# Patient Record
Sex: MALE | Race: WHITE | NOT HISPANIC OR LATINO | Employment: FULL TIME | URBAN - METROPOLITAN AREA
[De-identification: names, ages, dates, MRNs, and addresses within clinical notes are randomized per-mention and may not be internally consistent; named-entity substitution may affect disease eponyms.]

---

## 2019-04-01 ENCOUNTER — TRANSFERRED RECORDS (OUTPATIENT)
Dept: HEALTH INFORMATION MANAGEMENT | Facility: CLINIC | Age: 54
End: 2019-04-01

## 2019-08-08 ENCOUNTER — TRANSFERRED RECORDS (OUTPATIENT)
Dept: HEALTH INFORMATION MANAGEMENT | Facility: CLINIC | Age: 54
End: 2019-08-08

## 2019-08-26 ENCOUNTER — TRANSFERRED RECORDS (OUTPATIENT)
Dept: HEALTH INFORMATION MANAGEMENT | Facility: CLINIC | Age: 54
End: 2019-08-26

## 2021-03-31 ENCOUNTER — ANCILLARY PROCEDURE (OUTPATIENT)
Dept: GENERAL RADIOLOGY | Facility: CLINIC | Age: 56
End: 2021-03-31
Attending: ORTHOPAEDIC SURGERY
Payer: COMMERCIAL

## 2021-03-31 ENCOUNTER — OFFICE VISIT (OUTPATIENT)
Dept: ORTHOPEDICS | Facility: CLINIC | Age: 56
End: 2021-03-31
Payer: COMMERCIAL

## 2021-03-31 VITALS
HEIGHT: 69 IN | WEIGHT: 240.6 LBS | SYSTOLIC BLOOD PRESSURE: 120 MMHG | DIASTOLIC BLOOD PRESSURE: 90 MMHG | BODY MASS INDEX: 35.63 KG/M2

## 2021-03-31 DIAGNOSIS — M16.12 PRIMARY OSTEOARTHRITIS OF LEFT HIP: ICD-10-CM

## 2021-03-31 DIAGNOSIS — M25.551 BILATERAL HIP PAIN: ICD-10-CM

## 2021-03-31 DIAGNOSIS — M25.552 BILATERAL HIP PAIN: ICD-10-CM

## 2021-03-31 DIAGNOSIS — M16.11 PRIMARY OSTEOARTHRITIS OF RIGHT HIP: Primary | ICD-10-CM

## 2021-03-31 PROCEDURE — 73523 X-RAY EXAM HIPS BI 5/> VIEWS: CPT | Mod: TC | Performed by: RADIOLOGY

## 2021-03-31 PROCEDURE — 99204 OFFICE O/P NEW MOD 45 MIN: CPT | Performed by: ORTHOPAEDIC SURGERY

## 2021-03-31 RX ORDER — DICLOFENAC SODIUM 75 MG/1
75 TABLET, DELAYED RELEASE ORAL 2 TIMES DAILY PRN
Qty: 45 TABLET | Refills: 1 | Status: SHIPPED | OUTPATIENT
Start: 2021-03-31 | End: 2021-05-11

## 2021-03-31 SDOH — HEALTH STABILITY: MENTAL HEALTH: HOW MANY DRINKS CONTAINING ALCOHOL DO YOU HAVE ON A TYPICAL DAY WHEN YOU ARE DRINKING?: NOT ASKED

## 2021-03-31 SDOH — HEALTH STABILITY: MENTAL HEALTH: HOW OFTEN DO YOU HAVE SIX OR MORE DRINKS ON ONE OCCASION?: NOT ASKED

## 2021-03-31 SDOH — HEALTH STABILITY: MENTAL HEALTH: HOW OFTEN DO YOU HAVE A DRINK CONTAINING ALCOHOL?: NOT ASKED

## 2021-03-31 ASSESSMENT — PAIN SCALES - GENERAL: PAINLEVEL: WORST PAIN (10)

## 2021-03-31 ASSESSMENT — MIFFLIN-ST. JEOR: SCORE: 1908.79

## 2021-03-31 NOTE — LETTER
"    3/31/2021         RE: Marito Hernandes  [address]  [facility] [address]        Dear Colleague,    Thank you for referring your patient, Marito Hernandes, to the [facility]. Please see a copy of my visit note below.    ORTHOPEDIC CONSULT      Chief Complaint: Marito Hernandes is a 55 year old male who is being seen for   Chief Complaint   Patient presents with     Musculoskeletal Problem     bilateral hip pain     Consult       History of Present Illness:   Marito Hernandes is a 55 year old male who is seen in consultation at the request of self for evaluation of bilateral hip pain.  Patient reports many years of  R > L hip pain. No injury or incident started the pain. Reports slowly progressive pain. Described as c shaped pain around the hip.  Non-radiating. No numbness/tingling.  Worst with weight bearing, activity, walking and especially bad at night. Not much helps the pain anymore.   Treatments tried: multiple previous intra-articular steroid injections - last on 3/5/21 and lasted only 2 weeks on the right, still working on the left, activity modification, rest, watchful waiting, occasional ibuprofen, tylenol, aleve.  No previous surgeries to the hips. Was seeing [facility], recommended for total hip.   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     Hx of DM. Last A1c 7.3 2/2021. Takes metformin. PCP is with an outside group. Denies blood thinner or smoking hx.     Patient's past medical, surgical, social and family histories reviewed.     No past medical history on file.  DM  HX      No past surgical history on file.    Medications:  No current outpatient medications on file prior to visit.  No current facility-administered medications on file prior to visit.   Metformin  \"HTN medication\"      No Known Allergies    Social History     Occupational History     Not on file   Tobacco Use     Smoking " "status: Never Smoker     Smokeless tobacco: Never Used   Substance and Sexual Activity     Alcohol use: Yes     Drug use: Never     Sexual activity: Not on file       No family history on file.    REVIEW OF SYSTEMS  10 point review systems performed otherwise negative as noted as per history of present illness.    Physical Exam:  Vitals: BP (!) 120/90   Ht 1.74 m (5' 8.5\")   Wt 109.1 kg (240 lb 9.6 oz)   BMI 36.05 kg/m    BMI= Body mass index is 36.05 kg/m .  Constitutional: healthy, alert and no acute distress   Psychiatric: mentation appears normal and affect normal/bright  NEURO: no focal deficits  RESP: Normal with easy respirations and no use of accessory muscles to breathe, no audible wheezing or retractions  CV: RLE: no edema         Regular rate and rhythm by palpation  JOINT/EXTREMITIES:bilateral lower extremity: hip flexion 15 degrees less on the right compared to right. Mild pain with hip flexion on right. Good quad tone. Moderate reduction in internal and external rotation on the right. Pain with IR and ER, recreates hip pain. Negative straight leg raise. No focal tenderness to greater trochanteric and SI joint. Distal neurovascular grossly intact.    Lymph: no appreciated lymphedema   GAIT: not tested     Diagnostic Modalities:  bilateral hip X-ray: superior wear , with moderate joint space narrowing, subchondral sclerosis R > L  Independent visualization of the images was performed.      Impression: bilateral hip primary osteoarthritis    Plan:  All of the above pertinent physical exam and imaging modalities findings was reviewed with Marito and his spouse.  He has exhausted extensive conservative and having greater impact on quality of life. He would like to proceed with total hip replacement. However, he did have a steroid injection on 3/5/21, explained would need to wait unttil 6/5/21.  In the meantime, provided Voltaren Rx, handicap sticker (due to severe pain with ambulation), and will have " patient return to clinic about 4-6 weeks prior to when he would like joint replacement.     I have prescribed an antiinflammatory medication.  We discussed that it is the same class of some the common over the counter medications (Ibuprofen, Advil, Motrin, Aleve, Naproxen, and  Naprosyn). I recommend to avoid taking these OTC's medication when taking the medication that I prescribed. This medication should be stopped if having stomach issues, bleeding, high blood pressure and/or chest pain.       Return to clinic 4-6 weeks prior to when he wants total hip replacement, or sooner as needed for changes.  Re-x-ray on return: No    Scribed by:  TATUM Cabral, CNP  12:44 PM  3/31/2021  The information in this document, created by a scribe for me, accurately reflects the services I personally performed and the decisions made by me. I have reviewed and approved this document for accuracy    DO Marito Mayen to follow up with Primary Care provider regarding elevated blood pressure.  6 MONTH HANDICAP PARKING FORM GIVEN TO patient    Abelardo/FARRAH       Again, thank you for allowing me to participate in the care of your patient.        Sincerely,        Usman Fletcher DO

## 2021-03-31 NOTE — PROGRESS NOTES
Marito to follow up with Primary Care provider regarding elevated blood pressure.  6 MONTH HANDICAP PARKING FORM GIVEN TO patient    Abelardo/FARRAH

## 2021-03-31 NOTE — PROGRESS NOTES
"ORTHOPEDIC CONSULT      Chief Complaint: Marito Hernandes is a 55 year old male who is being seen for   Chief Complaint   Patient presents with     Musculoskeletal Problem     bilateral hip pain     Consult       History of Present Illness:   Marito Hernandes is a 55 year old male who is seen in consultation at the request of self for evaluation of bilateral hip pain.  Patient reports many years of  R > L hip pain. No injury or incident started the pain. Reports slowly progressive pain. Described as c shaped pain around the hip.  Non-radiating. No numbness/tingling.  Worst with weight bearing, activity, walking and especially bad at night. Not much helps the pain anymore.   Treatments tried: multiple previous intra-articular steroid injections - last on 3/5/21 and lasted only 2 weeks on the right, still working on the left, activity modification, rest, watchful waiting, occasional ibuprofen, tylenol, aleve.  No previous surgeries to the hips. Was seeing [facility], recommended for total hip.   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     Hx of DM. Last A1c 7.3 2/2021. Takes metformin. PCP is with an outside group. Denies blood thinner or smoking hx.     Patient's past medical, surgical, social and family histories reviewed.     No past medical history on file.  DM  HX      No past surgical history on file.    Medications:  No current outpatient medications on file prior to visit.  No current facility-administered medications on file prior to visit.   Metformin  \"HTN medication\"      No Known Allergies    Social History     Occupational History     Not on file   Tobacco Use     Smoking status: Never Smoker     Smokeless tobacco: Never Used   Substance and Sexual Activity     Alcohol use: Yes     Drug use: Never     Sexual activity: Not on file       No family history on file.    REVIEW OF SYSTEMS  10 point review systems performed " "otherwise negative as noted as per history of present illness.    Physical Exam:  Vitals: BP (!) 120/90   Ht 1.74 m (5' 8.5\")   Wt 109.1 kg (240 lb 9.6 oz)   BMI 36.05 kg/m    BMI= Body mass index is 36.05 kg/m .  Constitutional: healthy, alert and no acute distress   Psychiatric: mentation appears normal and affect normal/bright  NEURO: no focal deficits  RESP: Normal with easy respirations and no use of accessory muscles to breathe, no audible wheezing or retractions  CV: RLE: no edema         Regular rate and rhythm by palpation  JOINT/EXTREMITIES:bilateral lower extremity: hip flexion 15 degrees less on the right compared to right. Mild pain with hip flexion on right. Good quad tone. Moderate reduction in internal and external rotation on the right. Pain with IR and ER, recreates hip pain. Negative straight leg raise. No focal tenderness to greater trochanteric and SI joint. Distal neurovascular grossly intact.    Lymph: no appreciated lymphedema   GAIT: not tested     Diagnostic Modalities:  bilateral hip X-ray: superior wear , with moderate joint space narrowing, subchondral sclerosis R > L  Independent visualization of the images was performed.      Impression: bilateral hip primary osteoarthritis    Plan:  All of the above pertinent physical exam and imaging modalities findings was reviewed with Marito and his spouse.  He has exhausted extensive conservative and having greater impact on quality of life. He would like to proceed with total hip replacement. However, he did have a steroid injection on 3/5/21, explained would need to wait unttil 6/5/21.  In the meantime, provided Voltaren Rx, handicap sticker (due to severe pain with ambulation), and will have patient return to clinic about 4-6 weeks prior to when he would like joint replacement.     I have prescribed an antiinflammatory medication.  We discussed that it is the same class of some the common over the counter medications (Ibuprofen, Advil, " Motrin, Aleve, Naproxen, and  Naprosyn). I recommend to avoid taking these OTC's medication when taking the medication that I prescribed. This medication should be stopped if having stomach issues, bleeding, high blood pressure and/or chest pain.       Return to clinic 4-6 weeks prior to when he wants total hip replacement, or sooner as needed for changes.  Re-x-ray on return: No    Scribed by:  TATUM Cabral, CNP  12:44 PM  3/31/2021  The information in this document, created by a scribe for me, accurately reflects the services I personally performed and the decisions made by me. I have reviewed and approved this document for accuracy    Usman Fletcher, DO

## 2021-04-01 ENCOUNTER — TELEPHONE (OUTPATIENT)
Dept: ORTHOPEDICS | Facility: OTHER | Age: 56
End: 2021-04-01

## 2021-04-01 NOTE — TELEPHONE ENCOUNTER
"Reason for Call:  Other appointment    Detailed comments: Patient was in yesterday and he didn't want to get any medications. But due to his pain he is changing his mind and decided he wants to get something for pain, something that \"doesn't cause all that drowsiness stuff\" uses [facility] in [place]     Phone Number Patient can be reached at: Home number on file [phone] (home)    Best Time: any    Can we leave a detailed message on this number? YES    Call taken on 4/1/2021 at 2:11 PM by Nikki Patel      "

## 2021-04-01 NOTE — TELEPHONE ENCOUNTER
Writer called patient back. Informed him that there is a prescription on f diclofenac at his pharmccy. Reminded him not to take other NSAIDS with this. Patient verbalizes understanding and will call back if needed.  Nikki Gage RN on 4/1/2021 at 2:26 PM

## 2021-04-28 ENCOUNTER — TELEPHONE (OUTPATIENT)
Dept: ORTHOPEDICS | Facility: OTHER | Age: 56
End: 2021-04-28

## 2021-04-28 ENCOUNTER — OFFICE VISIT (OUTPATIENT)
Dept: ORTHOPEDICS | Facility: CLINIC | Age: 56
End: 2021-04-28
Payer: COMMERCIAL

## 2021-04-28 VITALS
BODY MASS INDEX: 36.95 KG/M2 | WEIGHT: 249.5 LBS | SYSTOLIC BLOOD PRESSURE: 120 MMHG | DIASTOLIC BLOOD PRESSURE: 80 MMHG | HEIGHT: 69 IN

## 2021-04-28 DIAGNOSIS — Z01.818 PRE-OP EXAM: Primary | ICD-10-CM

## 2021-04-28 DIAGNOSIS — M16.11 PRIMARY OSTEOARTHRITIS OF RIGHT HIP: Primary | ICD-10-CM

## 2021-04-28 PROCEDURE — 99214 OFFICE O/P EST MOD 30 MIN: CPT | Performed by: ORTHOPAEDIC SURGERY

## 2021-04-28 ASSESSMENT — PAIN SCALES - GENERAL: PAINLEVEL: WORST PAIN (10)

## 2021-04-28 ASSESSMENT — MIFFLIN-ST. JEOR: SCORE: 1953.92

## 2021-04-28 NOTE — TELEPHONE ENCOUNTER
Type of surgery: Right total hip replacement  Location of surgery: [facility]   Date of surgery: 6/8  Surgeon: Dr. Fletcher  Pre-Op Appt Date: patient will schedule with PCP at [facility]. Will do CBC at 1 week prior to total   Post-Op Appt Date: 6/23   Packet sent out: Surgery packet mailed to patient's home address.   Pre-cert/Authorization completed: NA  Date: 4/28/2021    Kaycee Cote  Surgery Scheduler

## 2021-04-28 NOTE — PROGRESS NOTES
Office Visit-Follow up    Chief Complaint: Marito Hernandes is a 55 year old male who is being seen for   Chief Complaint   Patient presents with     RECHECK     bilateral hip primary osteoarthritis       History of Present Illness:   Today's visit:  Returns for his right hip.  Severe pain.  He has been taking anti-inflammatories icing with no improvement.  Rates the pain is moderate to severe.  Worse with weightbearing.  He would like to proceed with hip replacement.  He had a previous intra-articular injection to the hip.  3-month window would be the first part of June March 31, 2021 visit:  Marito Hernandes is a 55 year old male who is seen in consultation at the request of self for evaluation of bilateral hip pain.  Patient reports many years of  R > L hip pain. No injury or incident started the pain. Reports slowly progressive pain. Described as c shaped pain around the hip.  Non-radiating. No numbness/tingling.  Worst with weight bearing, activity, walking and especially bad at night. Not much helps the pain anymore.   Treatments tried: multiple previous intra-articular steroid injections - last on 3/5/21 and lasted only 2 weeks on the right, still working on the left, activity modification, rest, watchful waiting, occasional ibuprofen, tylenol, aleve.  No previous surgeries to the hips. Was seeing [facility], recommended for total hip.   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      Hx of DM. Last A1c 7.3 2/2021. Takes metformin. PCP is with an outside group. Denies blood thinner or smoking hx.       Social History     Occupational History     Not on file   Tobacco Use     Smoking status: Never Smoker     Smokeless tobacco: Never Used   Substance and Sexual Activity     Alcohol use: Yes     Drug use: Never     Sexual activity: Not on file       REVIEW OF SYSTEMS  General: negative for, night sweats, dizziness, fatigue  Resp:  "No shortness of breath and no cough  CV: negative for chest pain, syncope or near-syncope  GI: negative for nausea, vomiting and diarrhea  : negative for dysuria and hematuria  Musculoskeletal: as above  Neurologic: negative for syncope   Hematologic: negative for bleeding disorder    Physical Exam:  Vitals: /80   Ht 1.748 m (5' 8.8\")   Wt 113.2 kg (249 lb 8 oz)   BMI 37.06 kg/m    BMI= Body mass index is 37.06 kg/m .  Constitutional: healthy, alert and no acute distress   Psychiatric: mentation appears normal and affect normal/bright  NEURO: no focal deficits  RESP: Normal with easy respirations and no use of accessory muscles to breathe, no audible wheezing or retractions  CV: RLE: No edema           SKIN: No erythema, rashes, excoriation, or breakdown. No evidence of infection.   JOINT/EXTREMITIES:right hip: Very limited active flexion.  Hip in a external rotation posture.  Negative straight leg  GAIT: not tested             Diagnostic Modalities:  Previous imaging reviewed.  Independent visualization of the images was performed.      Impression: right hip primary osteoarthritis    Plan:  All of the above pertinent physical exam and imaging modalities findings was reviewed with Marito.    The patient has attempted conservative treatments that include NSAIDs, steroid injections, activity modifications, rest yet still continues to have significant issues. These issues include pain at rest, increased pain with activity, pain that wakes at night, gait disturbances. Secondary to these reasons I have offered surgery in the form of right total hip replacement.    Risks, benefits, complications, alternatives, limitations, and post operative course were discussed. Risks including infection (requiring long-term antibiotics and repeat surgeries), implant loosening (requiring revision surgery), heart attacks, strokes, bleeding (possibly requiring blood transfusion), scars, leg length differences (causing a limp), " instability and dislocations, fractures, blood clots the legs and lungs, nerve injury (possibly leading to foot drop).   Postoperative course was discussed as far as limitations and expected recovery times. We also reviewed my anticipated surgical approach.  It was also discussed that after surgery there is a need for blood thinners to prevent blood clots, but even when being treated it is possible to develop a blood clot. Anticipate being hospitalized 1 day and subsequently either discharged home or to a rehabilitation facility. If discharge home from the hospital expect [facility] physical therapy for about 2 weeks and then transition to going to a physical therapy location for more aggressive therapy. Determinations of this will be based on progress with physical therapy while in the hospital. All questions were answered. The patient agrees to proceed with surgery.  Past medical and surgical history was reviewed.. Mr. Hernandes will need a pre-operative medical evaluation and clearance by a primary care provider. We will obtain a CBC as well as the appropriate radiographs prior to surgery. I will leave it to the discretion of the primary care provider for additional pre-operative testing.     Anticipate next day discharge.  Aspirin for DVT prophylaxis.    We will plan on seeing 1 week prior.    Surgery needs to be after June 1, 2021 due to steroid injection.    Return to clinic 14 days post-operatively. , or sooner as needed for changes.  Re-x-ray on return: Yes.    Clinton Fletcher D.O.

## 2021-04-28 NOTE — LETTER
4/28/2021         RE: Marito Hernandes  [address]  [facility] [address]        Dear Colleague,    Thank you for referring your patient, Marito Hernandes, to the [facility]. Please see a copy of my visit note below.    Office Visit-Follow up    Chief Complaint: Marito Hernandes is a 55 year old male who is being seen for   Chief Complaint   Patient presents with     RECHECK     bilateral hip primary osteoarthritis       History of Present Illness:   Today's visit:  Returns for his right hip.  Severe pain.  He has been taking anti-inflammatories icing with no improvement.  Rates the pain is moderate to severe.  Worse with weightbearing.  He would like to proceed with hip replacement.  He had a previous intra-articular injection to the hip.  3-month window would be the first part of June March 31, 2021 visit:  Marito Hernandes is a 55 year old male who is seen in consultation at the request of self for evaluation of bilateral hip pain.  Patient reports many years of  R > L hip pain. No injury or incident started the pain. Reports slowly progressive pain. Described as c shaped pain around the hip.  Non-radiating. No numbness/tingling.  Worst with weight bearing, activity, walking and especially bad at night. Not much helps the pain anymore.   Treatments tried: multiple previous intra-articular steroid injections - last on 3/5/21 and lasted only 2 weeks on the right, still working on the left, activity modification, rest, watchful waiting, occasional ibuprofen, tylenol, aleve.  No previous surgeries to the hips. Was seeing [facility], recommended for total hip.   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      Hx of DM. Last A1c 7.3 2/2021. Takes metformin. PCP is with an outside group. Denies blood thinner or smoking hx.       Social History     Occupational History     Not on file   Tobacco Use      "Smoking status: Never Smoker     Smokeless tobacco: Never Used   Substance and Sexual Activity     Alcohol use: Yes     Drug use: Never     Sexual activity: Not on file       REVIEW OF SYSTEMS  General: negative for, night sweats, dizziness, fatigue  Resp: No shortness of breath and no cough  CV: negative for chest pain, syncope or near-syncope  GI: negative for nausea, vomiting and diarrhea  : negative for dysuria and hematuria  Musculoskeletal: as above  Neurologic: negative for syncope   Hematologic: negative for bleeding disorder    Physical Exam:  Vitals: /80   Ht 1.748 m (5' 8.8\")   Wt 113.2 kg (249 lb 8 oz)   BMI 37.06 kg/m    BMI= Body mass index is 37.06 kg/m .  Constitutional: healthy, alert and no acute distress   Psychiatric: mentation appears normal and affect normal/bright  NEURO: no focal deficits  RESP: Normal with easy respirations and no use of accessory muscles to breathe, no audible wheezing or retractions  CV: RLE: No edema           SKIN: No erythema, rashes, excoriation, or breakdown. No evidence of infection.   JOINT/EXTREMITIES:right hip: Very limited active flexion.  Hip in a external rotation posture.  Negative straight leg  GAIT: not tested             Diagnostic Modalities:  Previous imaging reviewed.  Independent visualization of the images was performed.      Impression: right hip primary osteoarthritis    Plan:  All of the above pertinent physical exam and imaging modalities findings was reviewed with Marito.    The patient has attempted conservative treatments that include NSAIDs, steroid injections, activity modifications, rest yet still continues to have significant issues. These issues include pain at rest, increased pain with activity, pain that wakes at night, gait disturbances. Secondary to these reasons I have offered surgery in the form of right total hip replacement.    Risks, benefits, complications, alternatives, limitations, and post operative course were " discussed. Risks including infection (requiring long-term antibiotics and repeat surgeries), implant loosening (requiring revision surgery), heart attacks, strokes, bleeding (possibly requiring blood transfusion), scars, leg length differences (causing a limp), instability and dislocations, fractures, blood clots the legs and lungs, nerve injury (possibly leading to foot drop).   Postoperative course was discussed as far as limitations and expected recovery times. We also reviewed my anticipated surgical approach.  It was also discussed that after surgery there is a need for blood thinners to prevent blood clots, but even when being treated it is possible to develop a blood clot. Anticipate being hospitalized 1 day and subsequently either discharged home or to a rehabilitation facility. If discharge home from the hospital expect [facility] home physical therapy for about 2 weeks and then transition to going to a physical therapy location for more aggressive therapy. Determinations of this will be based on progress with physical therapy while in the hospital. All questions were answered. The patient agrees to proceed with surgery.  Past medical and surgical history was reviewed.. Mr. Hernandes will need a pre-operative medical evaluation and clearance by a primary care provider. We will obtain a CBC as well as the appropriate radiographs prior to surgery. I will leave it to the discretion of the primary care provider for additional pre-operative testing.     Anticipate next day discharge.  Aspirin for DVT prophylaxis.    We will plan on seeing 1 week prior.    Surgery needs to be after June 1, 2021 due to steroid injection.    Return to clinic 14 days post-operatively. , or sooner as needed for changes.  Re-x-ray on return: Yes.    Clinton Fletcher D.O.            Again, thank you for allowing me to participate in the care of your patient.        Sincerely,        Usman Fletcher, DO

## 2021-05-01 ENCOUNTER — HEALTH MAINTENANCE LETTER (OUTPATIENT)
Age: 56
End: 2021-05-01

## 2021-05-04 ENCOUNTER — TELEPHONE (OUTPATIENT)
Dept: ORTHOPEDICS | Facility: OTHER | Age: 56
End: 2021-05-04

## 2021-05-04 NOTE — TELEPHONE ENCOUNTER
Our goal is to have forms completed with 72 hours, however some forms may require a visit or additional information.    Who is the form from?: [employer] (if other please explain)  Where the form came from: form was faxed in  What clinic location was the form placed at?: [place]  Where the form was placed: [employer]  What number is listed as a contact on the form?: Shruthi S. Phone [phone]    Phone call message- patient request for a letter, form or note:    Date needed: at your convenience  Please fax to [employer]  [phone]  Has the patient signed a consent form for release of information? Not Applicable    Additional comments: none    Call taken on 5/4/2021 at 2:42 PM by Katherine Raymundo MA    Type of letter, form or note: disability     Faxed to number listed above.

## 2021-05-06 ENCOUNTER — TELEPHONE (OUTPATIENT)
Dept: ORTHOPEDICS | Facility: OTHER | Age: 56
End: 2021-05-06

## 2021-05-06 NOTE — TELEPHONE ENCOUNTER
I have filled out what I was able to and left for Hussain Recinos NP to finish    Abelardo/FARRAH

## 2021-05-06 NOTE — TELEPHONE ENCOUNTER
Reason for Call:  Form, our goal is to have forms completed with 72 hours, however, some forms may require a visit or additional information.    Type of letter, form or note:  disability    Who is the form from?: Patient    Where did the form come from: Patient or family brought in       What clinic location was the form placed at?: [place]    Where the form was placed: [facility]    What number is listed as a contact on the form?: [phone]       Additional comments:     Call taken on 5/6/2021 at 4:23 PM by Bobbi Avila

## 2021-05-10 DIAGNOSIS — M16.11 PRIMARY OSTEOARTHRITIS OF RIGHT HIP: ICD-10-CM

## 2021-05-10 DIAGNOSIS — M16.12 PRIMARY OSTEOARTHRITIS OF LEFT HIP: ICD-10-CM

## 2021-05-11 RX ORDER — DICLOFENAC SODIUM 75 MG/1
75 TABLET, DELAYED RELEASE ORAL 2 TIMES DAILY PRN
Qty: 40 TABLET | Refills: 1 | Status: ON HOLD | OUTPATIENT
Start: 2021-05-11 | End: 2021-06-01

## 2021-05-11 NOTE — TELEPHONE ENCOUNTER
I reviewed this patient's chart and his prescription is valid and I will refill it.  His GFR is greater than 60.  I called the patient to let him know that I refilled it.  I had to leave a voicemail.

## 2021-05-12 NOTE — TELEPHONE ENCOUNTER
Completed. Given to Katherine.  There is a section on the last page for employee signature. Not sure if this is something he needs to sign or not.    TATUM Lobo, CNP  Orthopedic Surgery

## 2021-05-15 DIAGNOSIS — Z11.59 ENCOUNTER FOR SCREENING FOR OTHER VIRAL DISEASES: ICD-10-CM

## 2021-05-25 RX ORDER — ASPIRIN 81 MG/1
81 TABLET, CHEWABLE ORAL DAILY
Status: ON HOLD | COMMUNITY
End: 2021-06-01

## 2021-05-25 RX ORDER — TAMSULOSIN HYDROCHLORIDE 0.4 MG/1
0.4 CAPSULE ORAL DAILY
COMMUNITY

## 2021-05-25 RX ORDER — LISINOPRIL AND HYDROCHLOROTHIAZIDE 12.5; 2 MG/1; MG/1
1 TABLET ORAL DAILY
COMMUNITY

## 2021-05-25 RX ORDER — OMEPRAZOLE 40 MG/1
40 CAPSULE, DELAYED RELEASE ORAL DAILY
COMMUNITY

## 2021-05-25 RX ORDER — MULTIPLE VITAMINS W/ MINERALS TAB 9MG-400MCG
1 TAB ORAL DAILY
COMMUNITY

## 2021-05-25 RX ORDER — SIMVASTATIN 40 MG
40 TABLET ORAL AT BEDTIME
COMMUNITY

## 2021-05-26 ENCOUNTER — OFFICE VISIT (OUTPATIENT)
Dept: ORTHOPEDICS | Facility: CLINIC | Age: 56
End: 2021-05-26
Payer: COMMERCIAL

## 2021-05-26 VITALS
SYSTOLIC BLOOD PRESSURE: 124 MMHG | HEIGHT: 69 IN | WEIGHT: 260 LBS | BODY MASS INDEX: 38.51 KG/M2 | DIASTOLIC BLOOD PRESSURE: 80 MMHG

## 2021-05-26 DIAGNOSIS — M16.11 PRIMARY OSTEOARTHRITIS OF RIGHT HIP: Primary | ICD-10-CM

## 2021-05-26 DIAGNOSIS — Z01.818 PRE-OP EXAM: ICD-10-CM

## 2021-05-26 LAB
BASOPHILS # BLD AUTO: 0.1 10E9/L (ref 0–0.2)
BASOPHILS NFR BLD AUTO: 0.8 %
DIFFERENTIAL METHOD BLD: NORMAL
EOSINOPHIL # BLD AUTO: 0.3 10E9/L (ref 0–0.7)
EOSINOPHIL NFR BLD AUTO: 3.5 %
ERYTHROCYTE [DISTWIDTH] IN BLOOD BY AUTOMATED COUNT: 12.5 % (ref 10–15)
HCT VFR BLD AUTO: 43.7 % (ref 40–53)
HGB BLD-MCNC: 14.7 G/DL (ref 13.3–17.7)
IMM GRANULOCYTES # BLD: 0 10E9/L (ref 0–0.4)
IMM GRANULOCYTES NFR BLD: 0.3 %
LYMPHOCYTES # BLD AUTO: 1.6 10E9/L (ref 0.8–5.3)
LYMPHOCYTES NFR BLD AUTO: 22.8 %
MCH RBC QN AUTO: 29.2 PG (ref 26.5–33)
MCHC RBC AUTO-ENTMCNC: 33.6 G/DL (ref 31.5–36.5)
MCV RBC AUTO: 87 FL (ref 78–100)
MONOCYTES # BLD AUTO: 0.5 10E9/L (ref 0–1.3)
MONOCYTES NFR BLD AUTO: 7.5 %
NEUTROPHILS # BLD AUTO: 4.7 10E9/L (ref 1.6–8.3)
NEUTROPHILS NFR BLD AUTO: 65.1 %
NRBC # BLD AUTO: 0 10*3/UL
NRBC BLD AUTO-RTO: 0 /100
PLATELET # BLD AUTO: 256 10E9/L (ref 150–450)
RBC # BLD AUTO: 5.04 10E12/L (ref 4.4–5.9)
WBC # BLD AUTO: 7.2 10E9/L (ref 4–11)

## 2021-05-26 PROCEDURE — 85025 COMPLETE CBC W/AUTO DIFF WBC: CPT | Performed by: ORTHOPAEDIC SURGERY

## 2021-05-26 PROCEDURE — 99207 PR PREOP VISIT IN GLOBAL PKG: CPT | Performed by: NURSE PRACTITIONER

## 2021-05-26 PROCEDURE — 36415 COLL VENOUS BLD VENIPUNCTURE: CPT | Performed by: ORTHOPAEDIC SURGERY

## 2021-05-26 ASSESSMENT — PAIN SCALES - GENERAL: PAINLEVEL: WORST PAIN (10)

## 2021-05-26 ASSESSMENT — MIFFLIN-ST. JEOR: SCORE: 2001.55

## 2021-05-26 NOTE — H&P (VIEW-ONLY)
1 week prior to total hip replacement.  Reviewed labs and H&P from outside physician. No red flags. Cleared for surgery.  Hx of DM.  A1c 7.3 on 2/2021. 5/20/21 down to 6.5. Diet controlled and takes Jardiance at night. Will resume POD0, Stopped meformin due to GI side effects.   Anticipate POD1 discharge   ASA for DVT prevention  Recommended to bring CPAP with.  Will obtain CBC today and contact patient with concerns.  COVID scheduled for Friday.  physical therapy will be direct to outpatient physical therapy with Joy Sena in [place].  Printout provided.     TATUM Lobo, CNP  Orthopedic Surgery

## 2021-05-26 NOTE — PROGRESS NOTES
1 week prior to total hip replacement.  Reviewed labs and H&P from outside physician. No red flags. Cleared for surgery.  Hx of DM.  A1c 7.3 on 2/2021. 5/20/21 down to 6.5. Diet controlled and takes Jardiance at night. Will resume POD0, Stopped meformin due to GI side effects.   Anticipate POD1 discharge   ASA for DVT prevention  Recommended to bring CPAP with.  Will obtain CBC today and contact patient with concerns.  COVID scheduled for Friday.  physical therapy will be direct to outpatient physical therapy with Joy Sena in [place].  Printout provided.     TATUM Lobo, CNP  Orthopedic Surgery

## 2021-05-26 NOTE — LETTER
5/26/2021         RE: Marito Hernandes  [address]  [facility] [address]        Dear Colleague,    Thank you for referring your patient, Marito Hernandes, to the [facility]. Please see a copy of my visit note below.    1 week prior to total hip replacement.  Reviewed labs and H&P from outside physician. No red flags. Cleared for surgery.  Hx of DM.  A1c 7.3 on 2/2021. 5/20/21 down to 6.5. Diet controlled and takes Jardiance at night. Will resume POD0, Stopped meformin due to GI side effects.   Anticipate POD1 discharge   ASA for DVT prevention  Recommended to bring CPAP with.  Will obtain CBC today and contact patient with concerns.  COVID scheduled for Friday.  physical therapy will be direct to outpatient physical therapy with Joy Sena in [place].  Printout provided.     TATUM Lobo, CNP  Orthopedic Surgery        Discussed dental precautions.     Same day surgery has the preop per Rebekah Zhou/FARRAH       Again, thank you for allowing me to participate in the care of your patient.        Sincerely,        TATUM Heredia CNP

## 2021-05-28 DIAGNOSIS — Z11.59 ENCOUNTER FOR SCREENING FOR OTHER VIRAL DISEASES: ICD-10-CM

## 2021-05-28 LAB
LABORATORY COMMENT REPORT: NORMAL
SARS-COV-2 RNA RESP QL NAA+PROBE: NEGATIVE
SARS-COV-2 RNA RESP QL NAA+PROBE: NORMAL
SPECIMEN SOURCE: NORMAL
SPECIMEN SOURCE: NORMAL

## 2021-05-28 PROCEDURE — U0005 INFEC AGEN DETEC AMPLI PROBE: HCPCS | Performed by: ORTHOPAEDIC SURGERY

## 2021-05-28 PROCEDURE — U0003 INFECTIOUS AGENT DETECTION BY NUCLEIC ACID (DNA OR RNA); SEVERE ACUTE RESPIRATORY SYNDROME CORONAVIRUS 2 (SARS-COV-2) (CORONAVIRUS DISEASE [COVID-19]), AMPLIFIED PROBE TECHNIQUE, MAKING USE OF HIGH THROUGHPUT TECHNOLOGIES AS DESCRIBED BY CMS-2020-01-R: HCPCS | Performed by: ORTHOPAEDIC SURGERY

## 2021-05-31 ENCOUNTER — ANESTHESIA EVENT (OUTPATIENT)
Dept: SURGERY | Facility: CLINIC | Age: 56
End: 2021-05-31
Payer: COMMERCIAL

## 2021-06-01 ENCOUNTER — ANESTHESIA (OUTPATIENT)
Dept: SURGERY | Facility: CLINIC | Age: 56
End: 2021-06-01
Payer: COMMERCIAL

## 2021-06-01 ENCOUNTER — APPOINTMENT (OUTPATIENT)
Dept: GENERAL RADIOLOGY | Facility: CLINIC | Age: 56
End: 2021-06-01
Attending: NURSE PRACTITIONER
Payer: COMMERCIAL

## 2021-06-01 ENCOUNTER — APPOINTMENT (OUTPATIENT)
Dept: PHYSICAL THERAPY | Facility: CLINIC | Age: 56
End: 2021-06-01
Attending: NURSE PRACTITIONER
Payer: COMMERCIAL

## 2021-06-01 ENCOUNTER — HOSPITAL ENCOUNTER (OUTPATIENT)
Facility: CLINIC | Age: 56
Discharge: HOME | End: 2021-06-02
Attending: ORTHOPAEDIC SURGERY | Admitting: ORTHOPAEDIC SURGERY
Payer: COMMERCIAL

## 2021-06-01 DIAGNOSIS — M16.11 PRIMARY OSTEOARTHRITIS OF RIGHT HIP: ICD-10-CM

## 2021-06-01 DIAGNOSIS — Z96.641 S/P HIP REPLACEMENT, RIGHT: Primary | ICD-10-CM

## 2021-06-01 DIAGNOSIS — R26.9 ABNORMAL GAIT: ICD-10-CM

## 2021-06-01 LAB
GLUCOSE BLDC GLUCOMTR-MCNC: 156 MG/DL (ref 70–99)
GLUCOSE BLDC GLUCOMTR-MCNC: 176 MG/DL (ref 70–99)

## 2021-06-01 PROCEDURE — 360N000077 HC SURGERY LEVEL 4, PER MIN: Performed by: ORTHOPAEDIC SURGERY

## 2021-06-01 PROCEDURE — 258N000003 HC RX IP 258 OP 636: Performed by: NURSE ANESTHETIST, CERTIFIED REGISTERED

## 2021-06-01 PROCEDURE — 27130 TOTAL HIP ARTHROPLASTY: CPT | Mod: RT | Performed by: ORTHOPAEDIC SURGERY

## 2021-06-01 PROCEDURE — 250N000011 HC RX IP 250 OP 636: Performed by: ORTHOPAEDIC SURGERY

## 2021-06-01 PROCEDURE — 97116 GAIT TRAINING THERAPY: CPT | Mod: GP | Performed by: PHYSICAL THERAPIST

## 2021-06-01 PROCEDURE — 710N000010 HC RECOVERY PHASE 1, LEVEL 2, PER MIN: Performed by: ORTHOPAEDIC SURGERY

## 2021-06-01 PROCEDURE — 250N000009 HC RX 250: Performed by: NURSE ANESTHETIST, CERTIFIED REGISTERED

## 2021-06-01 PROCEDURE — 27130 TOTAL HIP ARTHROPLASTY: CPT | Mod: AS | Performed by: NURSE PRACTITIONER

## 2021-06-01 PROCEDURE — 82962 GLUCOSE BLOOD TEST: CPT

## 2021-06-01 PROCEDURE — 97162 PT EVAL MOD COMPLEX 30 MIN: CPT | Mod: GP | Performed by: PHYSICAL THERAPIST

## 2021-06-01 PROCEDURE — 999N000141 HC STATISTIC PRE-PROCEDURE NURSING ASSESSMENT: Performed by: ORTHOPAEDIC SURGERY

## 2021-06-01 PROCEDURE — 258N000003 HC RX IP 258 OP 636: Performed by: ORTHOPAEDIC SURGERY

## 2021-06-01 PROCEDURE — 250N000011 HC RX IP 250 OP 636: Performed by: NURSE ANESTHETIST, CERTIFIED REGISTERED

## 2021-06-01 PROCEDURE — C1776 JOINT DEVICE (IMPLANTABLE): HCPCS | Performed by: ORTHOPAEDIC SURGERY

## 2021-06-01 PROCEDURE — 97530 THERAPEUTIC ACTIVITIES: CPT | Mod: GP | Performed by: PHYSICAL THERAPIST

## 2021-06-01 PROCEDURE — 250N000013 HC RX MED GY IP 250 OP 250 PS 637: Performed by: ORTHOPAEDIC SURGERY

## 2021-06-01 PROCEDURE — 250N000009 HC RX 250: Performed by: NURSE PRACTITIONER

## 2021-06-01 PROCEDURE — 999N000063 XR PELVIS AND HIP RIGHT 1 VIEW

## 2021-06-01 PROCEDURE — 97110 THERAPEUTIC EXERCISES: CPT | Mod: GP | Performed by: PHYSICAL THERAPIST

## 2021-06-01 PROCEDURE — 370N000017 HC ANESTHESIA TECHNICAL FEE, PER MIN: Performed by: ORTHOPAEDIC SURGERY

## 2021-06-01 PROCEDURE — 250N000013 HC RX MED GY IP 250 OP 250 PS 637: Performed by: NURSE PRACTITIONER

## 2021-06-01 PROCEDURE — 272N000001 HC OR GENERAL SUPPLY STERILE: Performed by: ORTHOPAEDIC SURGERY

## 2021-06-01 PROCEDURE — 258N000003 HC RX IP 258 OP 636: Performed by: NURSE PRACTITIONER

## 2021-06-01 PROCEDURE — 250N000011 HC RX IP 250 OP 636: Performed by: NURSE PRACTITIONER

## 2021-06-01 DEVICE — IMPLANTABLE DEVICE: Type: IMPLANTABLE DEVICE | Site: HIP | Status: FUNCTIONAL

## 2021-06-01 DEVICE — IMP SHELL ACET DEPUY PINNACLE GRIPTION 52MM 121732052: Type: IMPLANTABLE DEVICE | Site: HIP | Status: FUNCTIONAL

## 2021-06-01 DEVICE — IMP INSERT HIP DEPUY PINNACLE ALTRX 36X52MM 1221-36-052: Type: IMPLANTABLE DEVICE | Site: HIP | Status: FUNCTIONAL

## 2021-06-01 RX ORDER — FAMOTIDINE 20 MG/1
20 TABLET, FILM COATED ORAL 2 TIMES DAILY
Status: DISCONTINUED | OUTPATIENT
Start: 2021-06-01 | End: 2021-06-02 | Stop reason: HOSPADM

## 2021-06-01 RX ORDER — PHENYLEPHRINE HYDROCHLORIDE 10 MG/ML
INJECTION INTRAVENOUS PRN
Status: DISCONTINUED | OUTPATIENT
Start: 2021-06-01 | End: 2021-06-01

## 2021-06-01 RX ORDER — CEFAZOLIN SODIUM 2 G/100ML
2 INJECTION, SOLUTION INTRAVENOUS EVERY 8 HOURS
Status: COMPLETED | OUTPATIENT
Start: 2021-06-01 | End: 2021-06-01

## 2021-06-01 RX ORDER — SODIUM CHLORIDE 9 MG/ML
INJECTION, SOLUTION INTRAVENOUS CONTINUOUS
Status: DISCONTINUED | OUTPATIENT
Start: 2021-06-01 | End: 2021-06-02 | Stop reason: HOSPADM

## 2021-06-01 RX ORDER — TAMSULOSIN HYDROCHLORIDE 0.4 MG/1
0.4 CAPSULE ORAL DAILY
Status: DISCONTINUED | OUTPATIENT
Start: 2021-06-02 | End: 2021-06-02 | Stop reason: HOSPADM

## 2021-06-01 RX ORDER — POLYETHYLENE GLYCOL 3350 17 G/17G
17 POWDER, FOR SOLUTION ORAL DAILY
Status: DISCONTINUED | OUTPATIENT
Start: 2021-06-02 | End: 2021-06-02 | Stop reason: HOSPADM

## 2021-06-01 RX ORDER — SODIUM CHLORIDE, SODIUM LACTATE, POTASSIUM CHLORIDE, CALCIUM CHLORIDE 600; 310; 30; 20 MG/100ML; MG/100ML; MG/100ML; MG/100ML
INJECTION, SOLUTION INTRAVENOUS CONTINUOUS
Status: DISCONTINUED | OUTPATIENT
Start: 2021-06-01 | End: 2021-06-01 | Stop reason: HOSPADM

## 2021-06-01 RX ORDER — CEFAZOLIN SODIUM 2 G/100ML
2 INJECTION, SOLUTION INTRAVENOUS SEE ADMIN INSTRUCTIONS
Status: DISCONTINUED | OUTPATIENT
Start: 2021-06-01 | End: 2021-06-01 | Stop reason: HOSPADM

## 2021-06-01 RX ORDER — NALOXONE HYDROCHLORIDE 0.4 MG/ML
0.4 INJECTION, SOLUTION INTRAMUSCULAR; INTRAVENOUS; SUBCUTANEOUS
Status: DISCONTINUED | OUTPATIENT
Start: 2021-06-01 | End: 2021-06-02 | Stop reason: HOSPADM

## 2021-06-01 RX ORDER — ONDANSETRON 2 MG/ML
4 INJECTION INTRAMUSCULAR; INTRAVENOUS EVERY 6 HOURS PRN
Status: DISCONTINUED | OUTPATIENT
Start: 2021-06-01 | End: 2021-06-02 | Stop reason: HOSPADM

## 2021-06-01 RX ORDER — ACETAMINOPHEN 325 MG/1
975 TABLET ORAL EVERY 8 HOURS PRN
Qty: 100 TABLET | Refills: 1 | Status: SHIPPED | OUTPATIENT
Start: 2021-06-01

## 2021-06-01 RX ORDER — DIMENHYDRINATE 50 MG/ML
25 INJECTION, SOLUTION INTRAMUSCULAR; INTRAVENOUS
Status: DISCONTINUED | OUTPATIENT
Start: 2021-06-01 | End: 2021-06-01 | Stop reason: HOSPADM

## 2021-06-01 RX ORDER — ASPIRIN 81 MG/1
81 TABLET, CHEWABLE ORAL DAILY
COMMUNITY
Start: 2021-07-15 | End: 2021-07-12

## 2021-06-01 RX ORDER — TIZANIDINE 2 MG/1
2-4 TABLET ORAL 3 TIMES DAILY PRN
Qty: 45 TABLET | Refills: 1 | Status: SHIPPED | OUTPATIENT
Start: 2021-06-01 | End: 2021-07-12

## 2021-06-01 RX ORDER — HYDROMORPHONE HYDROCHLORIDE 1 MG/ML
0.4 INJECTION, SOLUTION INTRAMUSCULAR; INTRAVENOUS; SUBCUTANEOUS
Status: DISCONTINUED | OUTPATIENT
Start: 2021-06-01 | End: 2021-06-02 | Stop reason: HOSPADM

## 2021-06-01 RX ORDER — HYDROMORPHONE HCL IN WATER/PF 6 MG/30 ML
0.2 PATIENT CONTROLLED ANALGESIA SYRINGE INTRAVENOUS
Status: DISCONTINUED | OUTPATIENT
Start: 2021-06-01 | End: 2021-06-02 | Stop reason: HOSPADM

## 2021-06-01 RX ORDER — ACETAMINOPHEN 325 MG/1
975 TABLET ORAL EVERY 8 HOURS
Status: DISCONTINUED | OUTPATIENT
Start: 2021-06-01 | End: 2021-06-02 | Stop reason: HOSPADM

## 2021-06-01 RX ORDER — POLYETHYLENE GLYCOL 3350 17 G/17G
1 POWDER, FOR SOLUTION ORAL DAILY
Qty: 7 PACKET | Refills: 0 | Status: SHIPPED | OUTPATIENT
Start: 2021-06-01 | End: 2021-07-12

## 2021-06-01 RX ORDER — ONDANSETRON 4 MG/1
4 TABLET, ORALLY DISINTEGRATING ORAL EVERY 30 MIN PRN
Status: DISCONTINUED | OUTPATIENT
Start: 2021-06-01 | End: 2021-06-01 | Stop reason: HOSPADM

## 2021-06-01 RX ORDER — DEXTROSE MONOHYDRATE 25 G/50ML
25-50 INJECTION, SOLUTION INTRAVENOUS
Status: DISCONTINUED | OUTPATIENT
Start: 2021-06-01 | End: 2021-06-02 | Stop reason: HOSPADM

## 2021-06-01 RX ORDER — FENTANYL CITRATE 50 UG/ML
INJECTION, SOLUTION INTRAMUSCULAR; INTRAVENOUS PRN
Status: DISCONTINUED | OUTPATIENT
Start: 2021-06-01 | End: 2021-06-01

## 2021-06-01 RX ORDER — HYDROXYZINE HYDROCHLORIDE 25 MG/1
25 TABLET, FILM COATED ORAL EVERY 6 HOURS PRN
Status: DISCONTINUED | OUTPATIENT
Start: 2021-06-01 | End: 2021-06-02 | Stop reason: HOSPADM

## 2021-06-01 RX ORDER — HYDROMORPHONE HYDROCHLORIDE 1 MG/ML
.3-.5 INJECTION, SOLUTION INTRAMUSCULAR; INTRAVENOUS; SUBCUTANEOUS EVERY 5 MIN PRN
Status: DISCONTINUED | OUTPATIENT
Start: 2021-06-01 | End: 2021-06-01 | Stop reason: HOSPADM

## 2021-06-01 RX ORDER — ONDANSETRON 2 MG/ML
INJECTION INTRAMUSCULAR; INTRAVENOUS PRN
Status: DISCONTINUED | OUTPATIENT
Start: 2021-06-01 | End: 2021-06-01

## 2021-06-01 RX ORDER — OXYCODONE HYDROCHLORIDE 5 MG/1
5-10 TABLET ORAL EVERY 4 HOURS PRN
Status: DISCONTINUED | OUTPATIENT
Start: 2021-06-01 | End: 2021-06-02 | Stop reason: HOSPADM

## 2021-06-01 RX ORDER — FENTANYL CITRATE 50 UG/ML
25-50 INJECTION, SOLUTION INTRAMUSCULAR; INTRAVENOUS
Status: DISCONTINUED | OUTPATIENT
Start: 2021-06-01 | End: 2021-06-01 | Stop reason: HOSPADM

## 2021-06-01 RX ORDER — TIZANIDINE 2 MG/1
2-4 TABLET ORAL EVERY 6 HOURS PRN
Status: DISCONTINUED | OUTPATIENT
Start: 2021-06-01 | End: 2021-06-02 | Stop reason: HOSPADM

## 2021-06-01 RX ORDER — PROCHLORPERAZINE MALEATE 5 MG
10 TABLET ORAL EVERY 6 HOURS PRN
Status: DISCONTINUED | OUTPATIENT
Start: 2021-06-01 | End: 2021-06-02 | Stop reason: HOSPADM

## 2021-06-01 RX ORDER — ONDANSETRON 4 MG/1
4 TABLET, ORALLY DISINTEGRATING ORAL EVERY 6 HOURS PRN
Status: DISCONTINUED | OUTPATIENT
Start: 2021-06-01 | End: 2021-06-02 | Stop reason: HOSPADM

## 2021-06-01 RX ORDER — DEXAMETHASONE SODIUM PHOSPHATE 10 MG/ML
INJECTION, SOLUTION INTRAMUSCULAR; INTRAVENOUS PRN
Status: DISCONTINUED | OUTPATIENT
Start: 2021-06-01 | End: 2021-06-01

## 2021-06-01 RX ORDER — OXYCODONE HYDROCHLORIDE 5 MG/1
5-10 TABLET ORAL EVERY 4 HOURS PRN
Qty: 35 TABLET | Refills: 0 | Status: SHIPPED | OUTPATIENT
Start: 2021-06-01 | End: 2021-07-12

## 2021-06-01 RX ORDER — NICOTINE POLACRILEX 4 MG
15-30 LOZENGE BUCCAL
Status: DISCONTINUED | OUTPATIENT
Start: 2021-06-01 | End: 2021-06-02 | Stop reason: HOSPADM

## 2021-06-01 RX ORDER — CEFAZOLIN SODIUM 2 G/100ML
2 INJECTION, SOLUTION INTRAVENOUS
Status: COMPLETED | OUTPATIENT
Start: 2021-06-01 | End: 2021-06-01

## 2021-06-01 RX ORDER — HYDROXYZINE HYDROCHLORIDE 25 MG/1
25 TABLET, FILM COATED ORAL EVERY 6 HOURS PRN
Qty: 30 TABLET | Refills: 0 | Status: SHIPPED | OUTPATIENT
Start: 2021-06-01 | End: 2021-07-12

## 2021-06-01 RX ORDER — PROPOFOL 10 MG/ML
INJECTION, EMULSION INTRAVENOUS CONTINUOUS PRN
Status: DISCONTINUED | OUTPATIENT
Start: 2021-06-01 | End: 2021-06-01

## 2021-06-01 RX ORDER — LIDOCAINE 40 MG/G
CREAM TOPICAL
Status: DISCONTINUED | OUTPATIENT
Start: 2021-06-01 | End: 2021-06-01 | Stop reason: HOSPADM

## 2021-06-01 RX ORDER — LIDOCAINE 40 MG/G
CREAM TOPICAL
Status: DISCONTINUED | OUTPATIENT
Start: 2021-06-01 | End: 2021-06-02 | Stop reason: HOSPADM

## 2021-06-01 RX ORDER — BUPIVACAINE HYDROCHLORIDE 7.5 MG/ML
INJECTION, SOLUTION INTRASPINAL PRN
Status: DISCONTINUED | OUTPATIENT
Start: 2021-06-01 | End: 2021-06-01

## 2021-06-01 RX ORDER — TRANEXAMIC ACID 650 MG/1
1950 TABLET ORAL ONCE
Status: COMPLETED | OUTPATIENT
Start: 2021-06-01 | End: 2021-06-01

## 2021-06-01 RX ORDER — NALOXONE HYDROCHLORIDE 0.4 MG/ML
0.2 INJECTION, SOLUTION INTRAMUSCULAR; INTRAVENOUS; SUBCUTANEOUS
Status: DISCONTINUED | OUTPATIENT
Start: 2021-06-01 | End: 2021-06-02 | Stop reason: HOSPADM

## 2021-06-01 RX ORDER — ONDANSETRON 2 MG/ML
4 INJECTION INTRAMUSCULAR; INTRAVENOUS EVERY 30 MIN PRN
Status: DISCONTINUED | OUTPATIENT
Start: 2021-06-01 | End: 2021-06-01 | Stop reason: HOSPADM

## 2021-06-01 RX ORDER — AMOXICILLIN 250 MG
1 CAPSULE ORAL 2 TIMES DAILY
Status: DISCONTINUED | OUTPATIENT
Start: 2021-06-01 | End: 2021-06-02 | Stop reason: HOSPADM

## 2021-06-01 RX ORDER — AMOXICILLIN 250 MG
1-2 CAPSULE ORAL 2 TIMES DAILY
Qty: 30 TABLET | Refills: 0 | Status: SHIPPED | OUTPATIENT
Start: 2021-06-01 | End: 2021-07-12

## 2021-06-01 RX ADMIN — FAMOTIDINE 20 MG: 20 TABLET ORAL at 20:32

## 2021-06-01 RX ADMIN — PHENYLEPHRINE HYDROCHLORIDE 50 MCG: 10 INJECTION INTRAVENOUS at 08:22

## 2021-06-01 RX ADMIN — MIDAZOLAM 2 MG: 1 INJECTION INTRAMUSCULAR; INTRAVENOUS at 07:24

## 2021-06-01 RX ADMIN — CEFAZOLIN SODIUM 2 G: 2 INJECTION, SOLUTION INTRAVENOUS at 15:58

## 2021-06-01 RX ADMIN — PROPOFOL 125 MCG/KG/MIN: 10 INJECTION, EMULSION INTRAVENOUS at 07:39

## 2021-06-01 RX ADMIN — OXYCODONE HYDROCHLORIDE 5 MG: 5 TABLET ORAL at 20:58

## 2021-06-01 RX ADMIN — DEXAMETHASONE SODIUM PHOSPHATE 5 MG: 10 INJECTION, SOLUTION INTRAMUSCULAR; INTRAVENOUS at 08:08

## 2021-06-01 RX ADMIN — PHENYLEPHRINE HYDROCHLORIDE 0.1 MCG/KG/MIN: 10 INJECTION INTRAVENOUS at 07:33

## 2021-06-01 RX ADMIN — SODIUM CHLORIDE: 9 INJECTION, SOLUTION INTRAVENOUS at 11:47

## 2021-06-01 RX ADMIN — ONDANSETRON 4 MG: 2 INJECTION INTRAMUSCULAR; INTRAVENOUS at 08:18

## 2021-06-01 RX ADMIN — PHENYLEPHRINE HYDROCHLORIDE 100 MCG: 10 INJECTION INTRAVENOUS at 08:34

## 2021-06-01 RX ADMIN — ACETAMINOPHEN 975 MG: 325 TABLET, FILM COATED ORAL at 18:29

## 2021-06-01 RX ADMIN — MIDAZOLAM 2 MG: 1 INJECTION INTRAMUSCULAR; INTRAVENOUS at 07:21

## 2021-06-01 RX ADMIN — BUPIVACAINE HYDROCHLORIDE IN DEXTROSE 1.6 ML: 7.5 INJECTION, SOLUTION SUBARACHNOID at 07:32

## 2021-06-01 RX ADMIN — CEFAZOLIN SODIUM 2 G: 2 INJECTION, SOLUTION INTRAVENOUS at 22:31

## 2021-06-01 RX ADMIN — ACETAMINOPHEN 975 MG: 325 TABLET, FILM COATED ORAL at 11:53

## 2021-06-01 RX ADMIN — FAMOTIDINE 20 MG: 20 TABLET ORAL at 11:53

## 2021-06-01 RX ADMIN — TIZANIDINE 4 MG: 2 TABLET ORAL at 22:38

## 2021-06-01 RX ADMIN — PHENYLEPHRINE HYDROCHLORIDE 50 MCG: 10 INJECTION INTRAVENOUS at 08:16

## 2021-06-01 RX ADMIN — DOCUSATE SODIUM AND SENNOSIDES 1 TABLET: 8.6; 5 TABLET ORAL at 20:32

## 2021-06-01 RX ADMIN — PHENYLEPHRINE HYDROCHLORIDE 50 MCG: 10 INJECTION INTRAVENOUS at 08:29

## 2021-06-01 RX ADMIN — LIDOCAINE HYDROCHLORIDE 1 ML: 10 INJECTION, SOLUTION EPIDURAL; INFILTRATION; INTRACAUDAL; PERINEURAL at 06:48

## 2021-06-01 RX ADMIN — CEFAZOLIN SODIUM 2 G: 2 INJECTION, SOLUTION INTRAVENOUS at 07:21

## 2021-06-01 RX ADMIN — FENTANYL CITRATE 25 MCG: 50 INJECTION, SOLUTION INTRAMUSCULAR; INTRAVENOUS at 07:32

## 2021-06-01 RX ADMIN — TRANEXAMIC ACID 1950 MG: 650 TABLET ORAL at 05:51

## 2021-06-01 RX ADMIN — ASPIRIN 325 MG: 325 TABLET ORAL at 20:31

## 2021-06-01 RX ADMIN — OXYCODONE HYDROCHLORIDE 5 MG: 5 TABLET ORAL at 16:56

## 2021-06-01 RX ADMIN — OXYCODONE HYDROCHLORIDE 10 MG: 5 TABLET ORAL at 11:54

## 2021-06-01 RX ADMIN — SODIUM CHLORIDE, POTASSIUM CHLORIDE, SODIUM LACTATE AND CALCIUM CHLORIDE: 600; 310; 30; 20 INJECTION, SOLUTION INTRAVENOUS at 06:47

## 2021-06-01 RX ADMIN — DOCUSATE SODIUM AND SENNOSIDES 1 TABLET: 8.6; 5 TABLET ORAL at 11:53

## 2021-06-01 NOTE — ANESTHESIA PROCEDURE NOTES
Intrathecal Procedure Note  Pre-Procedure   Staff -        CRNA: Benson Brown APRN CRNA       Performed By: CRNA       Location: OR       Pre-Anesthestic Checklist: patient identified, IV checked, risks and benefits discussed, informed consent, monitors and equipment checked and pre-op evaluation  Timeout:       Correct Patient: Yes        Correct Procedure: Yes        Correct Site: Yes        Correct Position: Yes   Procedure Documentation  Procedure: intrathecal       Patient Position: sitting       Patient Prep/Sterile Barriers: sterile gloves, mask, patient draped       Skin prep: Betadine       Insertion Site: L3-4. (midline approach).       Needle Gauge: 25.        Needle Length (Inches): 3.5 (used all of the 3.5 needle to reach the dura)        Spinal Needle Type: Delta tip       Introducer used       Introducer: 20 G      # of attempts: 1 and  # of redirects:     Assessment/Narrative         Paresthesias: No.       CSF fluid: clear.      Opening pressure was cmH2O while  Sitting.      Comments:  Pt. Sitting on OR table, back prep with betadine, 1% xylocaine infiltrated at L3-4, 25 ga delta 3.5 inch placed with 20 ga. Introducer, + CSF flow, clear, - Heme, no pain, paresthesia noted/stated, pt. Jeannette. Procedure well, pt. Placed supine with Left lateral tilt in place.

## 2021-06-01 NOTE — OP NOTE
Procedure Date: 06/01/2021    PREOPERATIVE DIAGNOSIS:  Right hip primary osteoarthritis.    POSTOPERATIVE DIAGNOSIS:  Right hip primary osteoarthritis.    PROCEDURE:  Right total hip arthroplasty.    SURGEON:  Usman Fletcher DO    FIRST ASSISTANT:  Hussain Recinos, nurse practitioner (utilized throughout the procedure, assisting with leg positioning, assistance with implant placement and he provided skin closure).    ANESTHESIA:  Spinal with IV sedation.    COMPLICATIONS:  None.    ESTIMATED BLOOD LOSS:  350 mL    FLUIDS: 900 mL crystalloids.    COUNTS:  Correct.    DISPOSITION:  To PACU in stable condition.    CURRENT FINDINGS:  He has bone-on-bone arthritis of the hip joint with some rimming osteophytes.  His bone was a dense including the cancellous bone.    IMPLANTS:  Includes DePuy size 52 mm acetabular Blencoe shell with Gription a neutral ALTRX polyethylene acetabular liner, a size 11 Corail PATSY high offset femoral stem, a +1.5, 36 mm ceramic head.    OPERATIVE NOTE:  This is a pleasant 55-year-old male with complaints of right hip pain.  He has had pain for many years to actual bilateral hips.  Rates the pain as severe.  He has attempted anti-inflammatories, icing with no improvement.  It is worse with weightbearing.  He had a previous intra-articular hip injection, which provided transient pain relief.  His radiographs are clinically correlated.  Given his continued pain refractory to conservative care, impacting the quality of his life, we discussed proceeding with hip replacement.  Risks, benefits, complications discussed, postoperative timeframe for recovery reviewed.  Once thoroughly discussed with him, he opted to proceed.  He was met preoperatively.  Again, informed consent was verified, appropriate site was marked and he was wheeled to the operative suite #1.  Transferred to the OR table without any issues.  When deemed appropriate by Anesthesia, he was positioned in lateral decubitus position.  An  axillary roll was placed.  All bony prominences were padded.  He was secured to the table.  The right hip was then sterilely prepped and draped in normal manner.  Prior to incision, a timeout was performed.  Again, the appropriate patient, surgery and extremity was verified and antibiotics had been administered.    A posterior approach was utilized.  The skin was sharply incised after a timeout had been initiated.  Subcutaneous tissue, split in line.  Electrocautery was used for hemostasis.  The fascia was then visualized and split in line.  A Charnley retractor was placed.  The bursal tissue excised.  The short external rotators, piriformis and abductors identified.  Protecting the piriformis, an L-shaped type release was performed.  This was taken through the capsule and tagged for later repair.  With this completed, the hip was gently dislocated.  A Cobra retractor was placed around the femoral neck.  Based on preoperative templating, an osteotomy of the neck was then performed with no issues.  The leg was repositioned an anterior retractor was placed.  The labrum was sequentially excised and a posterior retractor was placed.  This allowed for adequate visualization.  The hip was sequentially reamed first medializing.  I then reamed to 52 mm, which provided a circumferential bleeding bed of bone.  At this point, the area was irrigated.  The acetabular liner was then placed into position.  It was placed based on intraoperative anatomy, the insertion guide and preoperative templating.  There was an excellent press fit.  The area was irrigated.  The acetabular liner was then locked in and confirmed to be locked.  Leg was repositioned.  A box osteotome was utilized to enter the proximal femur.  This was followed the box osteotome proximally.  This followed the canal finder.  The hip was then sequentially broached.  It started getting potted distally, so I reamed it.  With this, I was able to work the broach up to a  size 11, which provided rotational fit and stability.  Trialed a radial head and neck lengths opting for the 1.5 which recreated leg length and stability and was consistent with templating.  At this time, the broach was stable.  The broach was removed.  The area was irrigated.  The final prosthesis was placed with an excellent press fit, again, opting for the 1.5 which was recreating leg length and stability.  At this time, a 3-minute dilute Betadine lavage was performed followed with pulse lavage.  The short external rotators and capsule were repaired back to greater trochanter using Ethibond.  This followed with fascial closure, 2-0 subcutaneous followed with the running Monocryl suture and skin glue.  A sterile bandage was applied and subsequently transferred to PACU in stable condition.  He will be admitted back to the hospital with orthopedic care, DVT prophylaxis, pain management and physical therapy.    Usman Fletcher DO        D: 2021   T: 2021   MT: [id number]    Name:     ADENMASOOD  MRN:      [id number]        Account:        [id number]   :      1965           Procedure Date: 2021     Document: [id number]

## 2021-06-01 NOTE — ANESTHESIA PREPROCEDURE EVALUATION
Anesthesia Pre-Procedure Evaluation    Patient: Marito Hernandes   MRN: [id number] : 1965        Preoperative Diagnosis: Primary osteoarthritis of right hip [M16.11]   Procedure : Procedure(s):  Right total hip replacement     History reviewed. No pertinent past medical history.   History reviewed. No pertinent surgical history.   No Known Allergies   Social History     Tobacco Use     Smoking status: Never Smoker     Smokeless tobacco: Never Used   Substance Use Topics     Alcohol use: Yes      Wt Readings from Last 1 Encounters:   21 117.9 kg (260 lb)        Anesthesia Evaluation   Pt has had prior anesthetic. Type: General.    No history of anesthetic complications       ROS/MED HX  ENT/Pulmonary:     (+) sleep apnea, uses CPAP,     Neurologic:  - neg neurologic ROS     Cardiovascular:     (+) Dyslipidemia hypertension-----Previous cardiac testing   Echo: Date: Results:    Stress Test: Date: Results:    ECG Reviewed: Date: 21 Results:  SR  Cath: Date: Results:      METS/Exercise Tolerance:     Hematologic:  - neg hematologic  ROS     Musculoskeletal:   (+) arthritis,     GI/Hepatic:     (+) GERD, Asymptomatic on medication,     Renal/Genitourinary:  - neg Renal ROS     Endo:     (+) type II DM, Last HgA1c: 6.7, date: 21, Not using insulin, - not using insulin pump. Normal glucose range: BS today at 06:35 was 156- pt does not check his BS at home, not previously admitted for DM/DKA.     Psychiatric/Substance Use:  - neg psychiatric ROS     Infectious Disease:  - neg infectious disease ROS     Malignancy:  - neg malignancy ROS     Other:  - neg other ROS          Physical Exam    Airway  airway exam normal      Mallampati: II   TM distance: > 3 FB   Neck ROM: full   Mouth opening: > 3 cm    Respiratory Devices and Support         Dental       (+) missing      Cardiovascular   cardiovascular exam normal       Rhythm and rate: regular and normal     Pulmonary   pulmonary exam normal         breath sounds clear to auscultation           OUTSIDE LABS:  CBC:   Lab Results   Component Value Date    WBC 7.2 05/26/2021    HGB 14.7 05/26/2021    HCT 43.7 05/26/2021     05/26/2021     BMP: No results found for: NA, POTASSIUM, CHLORIDE, CO2, BUN, CR, GLC  COAGS: No results found for: PTT, INR, FIBR  POC: No results found for: BGM, HCG, HCGS  HEPATIC: No results found for: ALBUMIN, PROTTOTAL, ALT, AST, GGT, ALKPHOS, BILITOTAL, BILIDIRECT, JESSICA  OTHER: No results found for: PH, LACT, A1C, LJ, PHOS, MAG, LIPASE, AMYLASE, TSH, T4, T3, CRP, SED    Anesthesia Plan    ASA Status:  3   NPO Status:  NPO Appropriate    Anesthesia Type: Spinal.      Maintenance: TIVA.        Consents    Anesthesia Plan(s) and associated risks, benefits, and realistic alternatives discussed. Questions answered and patient/representative(s) expressed understanding.     - Discussed with:  Patient    Use of blood products discussed: No .     Postoperative Care    Pain management: IV analgesics, Oral pain medications.   PONV prophylaxis: Ondansetron (or other 5HT-3), Dexamethasone or Solumedrol     Comments:    The risks and benefits of anesthesia, and the alternatives where applicable, have been discussed with the patient, and they wish to proceed.     H&P reviewed: Unable to attach H&P to encounter due to EHR limitations. H&P Update: appropriate H&P reviewed, patient examined. No interval changes since H&P (within 30 days).         TATUM Arechiga CRNA

## 2021-06-01 NOTE — DISCHARGE INSTRUCTIONS
Total Hip Replacement Discharge Instructions                                      [phone]  Bone and Joint Service Line for issues or concerns    Wait for a couple days before restarting your lisinopril medication (for high blood pressure).  On Friday you can resume this. If you are feeling dizzy or lightheaded with getting up then wait to resume and have your blood pressure checked.  If you have a way to measure you blood pressure at home, once the systolic number (top number) is consistently >120 you may resume your lisinopril     You have been prescribed full strength aspirin.  Take 1 tablet twice a day for 42 days for clot prevention. Do not take any NSAIDs or other aspirin products during this time.  After 42 days may return to any previous NSAID or aspirin therapy.       General Care:  After surgery you may feel tired/sleepy. This is normal. If you have any question along the way please contact the office. If you feel it is an issue cannot wait for normal office hours, contact the on-call physician. You should not drive or operate machines/equipment until released by your physician to do so.     Bandages:    It s normal to have some blood-tinged fluid on your bandages, this may occur for a few days after being sent home from the hospital. Keep incision covered with hospital dressing (Aquacel) for one week. It is okay to shower with this dressing on. However, do not submerge your dressing and incision in water for roughly 2 weeks. After one week remove this dressing and then keep it clean, dry, and covered. If this dressing become looses or falls off it is ok to cover with gauze and tape.  Wash the incision gently with warm soapy water after removing your hospital dressing and lightly pat dry.      Swelling (edema) control:  Preventing swelling (edema) in your legs after surgery is very important. It is helpful for achieving optimal range of motion as well as preventing blood clots.  It starts with simple  things such as elevation of your legs and icing. Elevate your legs above your heart.    Do this for 20 minutes every couple hours the first few days after surgery. We also recommend ERIC hose (compression hose) to be worn. Wear on both legs during the day. You may remove at night. Wear these until directed to stop.    Bathing:  Do not submerge your incision in water such as a bath or pool. It is ok to shower when you get home but keep your incision covered with a sealed bandage. You may find in comfortable to get a shower chair for the first month while you continue to heal and get stronger.     Follow up:  Your follow up appointment should already be scheduled. If it s not, please call the office to verify an appointment 14 days after surgery. If there are no issues in your recovery you will have an appointment at 14 days, 6 weeks, 3 months, 6 months, and 1 year from your surgery date.     Diet:  You may not have a full appetite at discharge this should get better. Progress to bland foods such as crackers and bread and finally to your normal diet if you have no problems.  Avoid alcohol when taking narcotic pain medications.      Pain control:  Take your pain medications as prescribed. These medications may make you sleepy. Do not drive, operate equipment, or drink alcohol when taking these.  You may take Tylenol (Generic name is acetaminophen) as directed on the bottle for additional relief or in place of the prescribed pain medications as your pain gets better.  If the medications cause a reaction such as nausea or skin rash, stop taking them and contact your doctor. Please plan accordingly, pain medications will not be re-filled on the weekends or at night. Call the office during the day if you need more medications.    Blood thinner:  It is very important to take the prescribed medication as directed to prevent blood clots. No medication is perfect, so if you notice a sudden onset of pain/swelling in your calf  area call your doctor. If you notice a sudden onset of troubles breathing and/or chest pain call 911.     Icing:  It is common for some swelling, aching and stiffness to occur for awhile after a hip replacement.  Apply for 20 minutes at a time. For the first 1-2 weeks apply ice 2-3 x a day or more after therapy.    Walker/crutches:  Use a walker/crutches when you go home. You will transition to the use of a cane and finally to no additional support.     Physical Therapy:  The success of your hip replacement is based on doing physical therapy. You will have some pain and discomfort along the way. If you feel your pain is limiting your progress make sure to take some pain medication prior to your therapy session. If your pain medications is not working, talk to your surgeon.   For the first 1-2 weeks after going home you will have in-home physical therapy. The goal is to work on walking and feeling steady.  Usually after your first post-operative follow up you will move to out-patient physical therapy.     Activity:  Unless otherwise instructed, you can weight bear as tolerated with a walker/cane. For 6 weeks follow these listed precautions:  Do not bend the operated hip past 90 degrees (for example sitting in a low couch or toilet). Use a raised seat on your toilet for 6-8 weeks. If you find yourself in a low seat, keep your legs apart (don t let the knees touch) and kneecaps facing forward when getting up. Please ask for help.    Do not cross the midline of your body with the operated leg.  Do not rotate the operated leg inward, your toes and kneecap should point toward the ceiling when in bed.       Normal findings after surgery:  Numbness and tenderness around the incisions.   You may have bruising around the incisions and down the thigh.   Low grade fevers less than 100.5 degrees Fahrenheit are normal.   You will have some increased pain after your therapy sessions.     When to call the Office:  Temperature  greater than 101.5 degrees Fahreheit.  Fever, chills, and increasing pain in the hip.  Excessive drainage from the incisions that include bright red blood.  Drainage from the incisions sites that appear yellow, pus-like, or foul smelling.  Increasing pain the hip not relieved by the prescribed pain medications or ice.  Persistent nausea or vomiting   Increased pain or swelling in your calf area (in back above your ankle) that wasn t there when in the hospital.  Any other effects you feel are significant.  Call 911 if you experience any chest pain and/or shortness or breath.

## 2021-06-01 NOTE — BRIEF OP NOTE
[facility] Brief Operative Note    Pre-operative diagnosis: Primary osteoarthritis of right hip [M16.11]   Post-operative diagnosis: Same   Procedure: Procedure(s):  Right total hip replacement   Surgeon:  Anesthesia: Usman Fletcher DO  Spinal   Assistant(s): Hussain Recinos APRN, CNP, DNP (A advanced practice provider was necessary for his expertise, exposure and surgical assistance throughout the case.)   Estimated blood loss:  Tourniquet time/pressure:  Urine output:  Fluids: 350 ml  0 minutes at 0 mmHg  na  900 ml Crystalloids   Specimens: None   Findings: right hip primary osteoarthritis [id number]     Clinton Fletcher D.O.      Implant Name Type Inv. Item Serial No.  Lot No. LRB No. Used Action   IMP SHELL ACET DEPUY PINNACLE GRIPTION 52MM [id number] Total Joint Component/Insert IMP SHELL ACET DEPUY PINNACLE GRIPTION 52MM [id number]  [id number] Right 1 Implanted   IMP INSERT HIP DEPUY PINNACLE ALTRX [id number] 1221- - [id number] Total Joint Component/Insert IMP INSERT HIP DEPUY PINNACLE ALTRX [id number] 1221-  [id number] Right 1 Implanted   Head Femoral Biolox Ceramic 36mm +1.5    Depuy [id number] Right 1 Implanted   Stem Corail Cementless Femoral Size 11    Depuy [id number] Right 1 Implanted

## 2021-06-01 NOTE — PROGRESS NOTES
S-(situation): Patient registered to Observation. Patient arrived to room 273 via cart from PACU    B-(background): R AVERY    A-(assessment): alert and oriented.  Denies pain.  CMS intact.  No nausea.  Tolerating liquids.  Drsg CDI, ice to right hip.  TEDS and SCD's on.      R-(recommendations): Orders and observation goals reviewed with pt    Nursing Observation criteria listed below was met:    Skin issues/needs documented:Yes  Isolation needs addressed and Signage up: NA  Fall Prevention: Education given and documented: Yes  Education Assessment documented:Yes  Admission Education Documented: Yes  New medication patient education completed and documented (Possible Side Effects of Common Medications handout): Yes  OBS video/handout Reviewed & Documented: NA  Allergies Reviewed: Yes  Medication Reconciliation Complete: Yes  Home medications if not able to send immediately home with family stored here: NA  Reminder note placed in discharge instructions of home meds: NA  Patient has discharge needs (If yes, please explain): Yes  Patient discharge preferences addressed and charted on white board:  Yes

## 2021-06-01 NOTE — PROGRESS NOTES
06/01/21 1300   Quick Adds   Type of Visit Initial PT Evaluation       Present no   Living Environment   People in home spouse   Current Living Arrangements house   Home Accessibility stairs to enter home;stairs within home   Number of Stairs, Main Entrance 3   Stair Railings, Main Entrance none   Number of Stairs, Within Home, Primary   (does not have to manage)   Transportation Anticipated family or friend will provide  (truck with running board)   Living Environment Comments wife able to physically assist and provide 24/7 assist tomorrow and then over the weekend. Patient has arrangement for rides to [employer]. step over tub/shower. flat bed.   Self-Care   Usual Activity Tolerance good   Current Activity Tolerance fair   Regular Exercise No   Equipment Currently Used at Home none   Disability/Function   Hearing Difficulty or Deaf no   Wear Glasses or Blind no   Concentrating, Remembering or Making Decisions Difficulty no   Difficulty Communicating no   Difficulty Eating/Swallowing no   Walking or Climbing Stairs Difficulty no   Dressing/Bathing Difficulty no   Toileting issues no   Doing Errands Independently Difficulty (such as shopping) yes   Fall history within last six months no   Change in Functional Status Since Onset of Current Illness/Injury yes   General Information   Onset of Illness/Injury or Date of Surgery 06/01/21   Referring Physician Dr. Fletcher   Patient/Family Therapy Goals Statement (PT) home with outpatient PT   Pertinent History of Current Problem (include personal factors and/or comorbidities that impact the POC) Patient is a 55 year old male, day 0 status post right total hip arthroplasty by Dr. Fletcher, spinal nerve block with 350mL EBL. Patient with a previous medical history of GILMA with CPAP, GERD, DM type 2, HTN, dyslipidemia.    Existing Precautions/Restrictions brace worn when out of bed;fall   Weight-Bearing Status - LUE full weight-bearing   Weight-Bearing Status -  RUE full weight-bearing   Weight-Bearing Status - LLE full weight-bearing   Weight-Bearing Status - RLE weight-bearing as tolerated   General Observations PT orders: eval and treat post operative hip. Activity orders: ambulate, up in chair, posterior hip precautions   Cognition   Orientation Status (Cognition) oriented x 4   Affect/Mental Status (Cognition) WFL   Follows Commands (Cognition) WFL   Pain Assessment   Patient Currently in Pain Yes, see Vital Sign flowsheet  (7/10 right hip nagging pain)   Integumentary/Edema   Integumentary/Edema Comments post operative dressings CDI   Posture    Posture Not impaired   Posture Comments fidgiting and flexing right hip in bed   Range of Motion (ROM)   ROM Comment right hip to 90 with ease. pain with right hip abduction   Strength   Strength Comments able to SLR flexion   Bed Mobility   Bed Mobility rolling left;rolling right;supine-sit;sit-supine   Rolling Left [place] (Bed Mobility) independent   Rolling Right [place] (Bed Mobility) independent   Supine-Sit [place] (Bed Mobility) independent   Sit-Supine [place] (Bed Mobility) independent   Bed Mobility Limitations impaired ability to control trunk for mobility   Impairments Contributing to Impaired Bed Mobility decreased strength;decreased ROM;pain   Transfers   Transfers sit-stand transfer   Transfer Safety Concerns Noted decreased step length   Impairments Contributing to Impaired Transfers pain   Sit-Stand Transfer   Sit-Stand [place] (Transfers) supervision;verbal cues   Assistive Device (Sit-Stand Transfers) walker, front-wheeled   Gait/Stairs (Locomotion)   [place] Level (Gait) supervision;verbal cues   Assistive Device (Gait) walker, front-wheeled   Distance in Feet (Required for LE Total Joints) 80'   Pattern (Gait) step-through   Deviations/Abnormal Patterns (Gait) weight shifting decreased;gait speed decreased;base of support, narrow;antalgic   Maintains Weight-bearing Status  (Gait) able to maintain   Balance   Balance Comments no LOB with mobilization, minimal use of walker. due to operative procedure and hip status a walker is advised for use at discharge   Sensory Examination   Sensory Perception WFL   Coordination   Coordination no deficits were identified   Muscle Tone   Muscle Tone no deficits were identified   Clinical Impression   Criteria for Skilled Therapeutic Intervention yes, treatment indicated   PT Diagnosis (PT) muscle weakness, muscle tension, impaired gait, impaired balance   Influenced by the following impairments day 0 status post right total hip arthroplasty   Functional limitations due to impairments decreased activity tolerance, fatigue, pain, use of walker for safe mobility   Clinical Presentation Evolving/Changing   Clinical Presentation Rationale post operative status, clinical judgement, medical status, functional mobility tolerance and increased pain   Clinical Decision Making (Complexity) moderate complexity   Therapy Frequency (PT) 2x/day   Predicted Duration of Therapy Intervention (days/wks) 2 days   Planned Therapy Interventions (PT) balance training;bed mobility training;gait training;home exercise program;patient/family education;ROM (range of motion);strengthening;transfer training   Anticipated Equipment Needs at Discharge (PT) walker, rolling   Risk & Benefits of therapy have been explained evaluation/treatment results reviewed;care plan/treatment goals reviewed;risks/benefits reviewed;participants included;patient;spouse/significant other   Clinical Impression Comments Patient presents with signs and symptoms consistent with post operative AVERY. Patient receptive to education and tolertated activity well this date. Patient with adequate assist at home and is anticipated to do well with discharge home tomorrow with this wife. Patient would benefit from continued PT during hospitalization to progress towards safe mobilization and towards patient's IND  goals in accordance with his surgeon's protocol.   PT Discharge Planning    PT Discharge Recommendation (DC Rec) home with assist;home with outpatient physical therapy   PT Rationale for DC Rec Home with wife, able to mobilize with minimal support for POD 0, anticipate he will do well with discharge home tomorrow after training.    PT Brief overview of current status  IND bed mobility. SBA sit to/from stand with walker. Amb 80' wth 2WW,. HEP well tolerated   Total Evaluation Time   Total Evaluation Time (Minutes) 15     Thank you for your referral.  Dorothy Lord, PT, DPT, ATC    [facility]  O: [phone]  E: [email or web address]

## 2021-06-01 NOTE — INTERVAL H&P NOTE
This H&P has been reviewed and there are no clinically significant changes in the patient s condition.  The patient was evaluated by myself as well as Cielo Mansfield prior to surgery. The Patient is approved for the surgery and the stated surgical procedure is still clinically indicated.

## 2021-06-01 NOTE — PROGRESS NOTES
Transfer from pacu to Room med/surg  Transferred to bed via glyder mat   S: 56 y/o M S/P Right AVERY  Anesthesia Type: Spinal with MAC  Surgeon: Dr. Fletcher  Allergies: See Medication Reconciliation Record  DNR: No  B: Pertinent Medical History: History reviewed. No pertinent past medical history.  Surgical History: History reviewed. No pertinent surgical history.  A: EBL: 350ml  IVF: 900 ml  UOP: none; Bladder scan: 0 ml  NPO: ___Yes _X__No   Vomiting: ___Yes _X__No   Drainage: none  Skin Integrity: Intact except right hip incision   RFO: ___Yes__X_No  Brace/sling/equipment: ___Yes_X__No   See PACU record for ongoing assessment, vital signs and pain assessment.  R: Post-Op vitals and assessments as ordered/indicated per patient's condition.  Follow Post-Op orders and notify Physician prn.  Continue to involve patient/family in plan of care and discharge planning.  Reinforce Pre-Operative education.  Implement skin safety interventions as appropriate.  Name: Myranda MONTEJO RN, report given to John NETTLES

## 2021-06-01 NOTE — ANESTHESIA CARE TRANSFER NOTE
Patient: Marito Hernandes    Procedure(s):  Right total hip replacement    Diagnosis: Primary osteoarthritis of right hip [M16.11]  Diagnosis Additional Information: No value filed.    Anesthesia Type:   Spinal     Note:  Anesthesia Care Transfer Notewriter  Vitals: (Last set prior to Anesthesia Care Transfer)  CRNA VITALS  6/1/2021 0910 - 6/1/2021 0944      6/1/2021             Resp Rate (observed):  18        Electronically Signed By: TATUM Arechiga CRNA  June 1, 2021  9:44 AM

## 2021-06-01 NOTE — ANESTHESIA CARE TRANSFER NOTE
Patient: Marito Hernandes    Procedure(s):  Right total hip replacement    Diagnosis: Primary osteoarthritis of right hip [M16.11]  Diagnosis Additional Information: No value filed.    Anesthesia Type:   Spinal     Note:    Oropharynx: oropharynx clear of all foreign objects and spontaneously breathing  Level of Consciousness: drowsy  Oxygen Supplementation: face mask    Independent Airway: airway patency satisfactory and stable  Dentition: dentition unchanged  Vital Signs Stable: post-procedure vital signs reviewed and stable  Report to RN Given: handoff report given  Patient transferred to: PACU    Handoff Report: Identifed the Patient, Identified the Reponsible Provider, Reviewed the pertinent medical history, Discussed the surgical course, Reviewed Intra-OP anesthesia mangement and issues during anesthesia, Set expectations for post-procedure period and Allowed opportunity for questions and acknowledgement of understanding      Vitals: (Last set prior to Anesthesia Care Transfer)  CRNA VITALS  6/1/2021 0910 - 6/1/2021 0944      6/1/2021             Resp Rate (observed):  18        Electronically Signed By: TATUM rAechiga CRNA  June 1, 2021  9:44 AM

## 2021-06-01 NOTE — PROGRESS NOTES
"Saw and examined patient.  POD0 s/p total hip replacement, right  Per patient doing very well. Mild pain controlled by oral medications.  No current nausea toleraing oral intact, will be advancing diet as tolerated.  Has not voided yet, DTV. Has stood at bedside and has had first session with physical therapy. Reports went well.  Patient has 24/7 help available upon discharge.  No current concerns.    Reinforced posterior precautions, answered all questions.     BP (!) 146/85   Pulse 88   Temp 98  F (36.7  C) (Oral)   Resp 18   Ht 1.778 m (5' 10\")   SpO2 96%   BMI 37.31 kg/m      Alert and orient x 4, sitting up in bed in NAD.   Dressing CDI.  Sensation, motor intact to foot.  Toes well perfused. D.p. Pulse 2+    Plan:  Is diabetic but only checks bgl daily.  Is only on Jardiance for oral diabetic agent. Took last dose this Am, will plan to resume tomorrow AM.  Ok to take his own home supply.    will see again tomorrow Am.   Plan for discharge tomorrow afternoon pending progress.      TATUM Lobo, CNP  Orthopedic Surgery    "

## 2021-06-02 ENCOUNTER — APPOINTMENT (OUTPATIENT)
Dept: PHYSICAL THERAPY | Facility: CLINIC | Age: 56
End: 2021-06-02
Attending: ORTHOPAEDIC SURGERY
Payer: COMMERCIAL

## 2021-06-02 VITALS
RESPIRATION RATE: 20 BRPM | OXYGEN SATURATION: 98 % | HEART RATE: 75 BPM | HEIGHT: 70 IN | SYSTOLIC BLOOD PRESSURE: 136 MMHG | TEMPERATURE: 98.5 F | DIASTOLIC BLOOD PRESSURE: 75 MMHG | BODY MASS INDEX: 37.31 KG/M2

## 2021-06-02 LAB
GLUCOSE SERPL-MCNC: 155 MG/DL (ref 70–99)
HGB BLD-MCNC: 10.9 G/DL (ref 13.3–17.7)

## 2021-06-02 PROCEDURE — 82947 ASSAY GLUCOSE BLOOD QUANT: CPT | Performed by: NURSE PRACTITIONER

## 2021-06-02 PROCEDURE — 97530 THERAPEUTIC ACTIVITIES: CPT | Mod: GP | Performed by: PHYSICAL THERAPIST

## 2021-06-02 PROCEDURE — 85018 HEMOGLOBIN: CPT | Performed by: NURSE PRACTITIONER

## 2021-06-02 PROCEDURE — 250N000013 HC RX MED GY IP 250 OP 250 PS 637: Performed by: NURSE PRACTITIONER

## 2021-06-02 PROCEDURE — 97116 GAIT TRAINING THERAPY: CPT | Mod: GP | Performed by: PHYSICAL THERAPIST

## 2021-06-02 PROCEDURE — 36415 COLL VENOUS BLD VENIPUNCTURE: CPT | Performed by: NURSE PRACTITIONER

## 2021-06-02 RX ADMIN — FAMOTIDINE 20 MG: 20 TABLET ORAL at 09:13

## 2021-06-02 RX ADMIN — OXYCODONE HYDROCHLORIDE 5 MG: 5 TABLET ORAL at 01:17

## 2021-06-02 RX ADMIN — DOCUSATE SODIUM AND SENNOSIDES 1 TABLET: 8.6; 5 TABLET ORAL at 09:13

## 2021-06-02 RX ADMIN — TAMSULOSIN HYDROCHLORIDE 0.4 MG: 0.4 CAPSULE ORAL at 09:14

## 2021-06-02 RX ADMIN — ACETAMINOPHEN 975 MG: 325 TABLET, FILM COATED ORAL at 03:16

## 2021-06-02 RX ADMIN — HYDROXYZINE HYDROCHLORIDE 25 MG: 25 TABLET, FILM COATED ORAL at 01:17

## 2021-06-02 RX ADMIN — ASPIRIN 325 MG: 325 TABLET ORAL at 09:14

## 2021-06-02 RX ADMIN — OXYCODONE HYDROCHLORIDE 5 MG: 5 TABLET ORAL at 09:13

## 2021-06-02 NOTE — PROGRESS NOTES
"[facility]  Orthopedics Progress Note           Assessment and Plan:    Assessment:   Post-operative day #1 Procedure(s):  Right total hip replacement   Hx of DM: blood glucose check <180.  On Jardiance        Plan:   Encourage IS  Start or continue physical therapy  Activity as tolerated  Weight-bear as tolerated with posterior hip precautions x 6 weeks  Discharge from hospital today.  Pain control measures  Advance diet as tolerated  Routine wound care: just prior to discharge RN to replace bulky dressing with sterile aquacell with sterile procedure, place ERIC stocking after dressing change.  DVT Prophylaxis: Aspirin , SCD's, Compression Hose  No acute medical issues.            Interval History:   Doing well.  Continues to improve.  Pain is well-controlled.  No fevers.   States tolerating oral intake, is voiding, ambulating all without difficulty.  Has no concerns. Would like to go home today.            Review of Systems:    The patient denies any chest pain, shortness of breath, excessive pain, fever, chills, purulent drainage from the wound, nausea or vomiting.               Physical Exam:   General: awake, alert, appropriate and in no acute distress  Blood pressure 136/75, pulse 75, temperature 98.5  F (36.9  C), temperature source Oral, resp. rate 20, height 1.778 m (5' 10\"), SpO2 98 %.  Right hip:  Dressing clean, dry and intact. Surrounding skin intact, no breakdown. Calf is soft, nontender with no significant swelling. Distal neurovascular grossly intact including 2+ distal pulse, sensation intact to foot, able to df/pf against resistance. Brisk cap refill. .  Compartments soft and non-tender.             Data:     Results for orders placed or performed during the hospital encounter of 06/01/21 (from the past 24 hour(s))   Glucose by meter   Result Value Ref Range    Glucose 176 (H) 70 - 99 mg/dL   XR Pelvis w Hip Right 1 View    Narrative    PELVIS AND HIP RIGHT ONE VIEW   6/1/2021 10:22 " AM     HISTORY: Status post total hip replacement, right.    COMPARISON: 3/31/2021 x-ray.      Impression    IMPRESSION: Right total hip arthroplasty in place. No evidence of  complication. Mild-to-moderate left hip degenerative changes.    KAMLA FELIZ MD   Hemoglobin   Result Value Ref Range    Hemoglobin 10.9 (L) 13.3 - 17.7 g/dL   Glucose   Result Value Ref Range    Glucose 155 (H) 70 - 99 mg/dL     TATUM Lobo, CNP  Orthopedic Surgery

## 2021-06-02 NOTE — ANESTHESIA POSTPROCEDURE EVALUATION
Patient: Marito Hernandes    Procedure(s):  Right total hip replacement    Diagnosis:Primary osteoarthritis of right hip [M16.11]  Diagnosis Additional Information: No value filed.    Anesthesia Type:  Spinal    Note:  Disposition: Outpatient   Postop Pain Control: Uneventful            Sign Out: Well controlled pain   PONV: No   Neuro/Psych: Uneventful            Sign Out: Acceptable/Baseline neuro status   Airway/Respiratory: Uneventful            Sign Out: Acceptable/Baseline resp. status   CV/Hemodynamics: Uneventful            Sign Out: Acceptable CV status   Other NRE: NONE   DID A NON-ROUTINE EVENT OCCUR? No    Event details/Postop Comments:  Pt was happy with anesthesia care.  No complications.  I will follow up with the pt if needed.           Last vitals:  Vitals:    06/01/21 2252 06/02/21 0227 06/02/21 0606   BP: (!) 178/72 (!) 145/80 136/75   Pulse: 60 86 75   Resp: 20 20 20   Temp:  98.8  F (37.1  C) 98.5  F (36.9  C)   SpO2: 98% 98% 98%       Last vitals prior to Anesthesia Care Transfer:  CRNA VITALS  6/1/2021 0910 - 6/1/2021 1010      6/1/2021             Resp Rate (observed):  18          Electronically Signed By: TATUM Cason CRNA  June 2, 2021  9:37 AM

## 2021-06-02 NOTE — PROGRESS NOTES
Four Shift note:    Pt is A &O x4. CMS intact. Dressing is CDI. Pt ambulated 100 feet this shift. Pt had Oxycodone x1 so far this shift and zanaflex x1. Cpap in place. LS are CTA. BS audible x4. Tolerating oral intake. IVF SL at this time-per orders. VS-Bp is slightly hypertensive. Afebrile.

## 2021-06-02 NOTE — PLAN OF CARE
Physical Therapy Discharge Summary    Reason for therapy discharge:    Discharged to home with outpatient therapy.    Progress towards therapy goal(s). See goals on Care Plan in [facility] electronic health record for goal details.  Goals met    Therapy recommendation(s):    Continued therapy is recommended.  Rationale/Recommendations:   . Patient would benefit from continued skilled therapeutic intervention in order to progress them towards a higher level of function in accordance with their surgeon's protocol.    Thank you for your referral.  Dorothy Lord, PT, DPT, ATC    [facility]  O: [phone]  E: [email or web address]

## 2021-06-02 NOTE — PLAN OF CARE
"S-(situation): End of shift note    B-(background): Rt total hip replacement    A-(assessment): Vital signs stable, HTN. BP (!) 145/80   Pulse 86   Temp 98.8  F (37.1  C) (Oral)   Resp 20   Ht 1.778 m (5' 10\")   SpO2 98%   BMI 37.31 kg/m  .  Afebrile. Lung sounds CTA B/L.  IS in use. On CPAP, no O2. A&O x 4. Hip pain controlled with oxycodone 5 mg, zanaflex and tylenol.  Bowel sounds normoactive. Ambulating with SBA and walker, steady on feet.  Voiding adequately.  SCDs in use. CMS intact. No visible drainage, dressing CDI.  IV site asymptomatic. Pt needs more education on the use of walker, tries to walk without it.  Able to make needs known and uses call light appropriately.     R-(recommendations): Continue to monitor per care plan.    "

## 2021-06-02 NOTE — PLAN OF CARE
Patient vital signs are at baseline: Yes  Patient able to ambulate as they were prior to admission or with assist devices provided by therapies during their stay:  Yes walked 100ft in hallway  Patient MUST void prior to discharge:  Yes  Patient able to tolerate oral intake:  Yes  Pain has adequate pain control using Oral analgesics:  Yes

## 2021-06-02 NOTE — DISCHARGE SUMMARY
"[facility] Discharge Summary     Marito Hernandes MRN# [id number]   YOB: 1965 Age: 55 year old     Date of Admission:  6/1/2021  Date of Discharge:  6/2/2021  Admitting Physician:  Usman Fletcher DO  Discharge Physician:  TATUM Heredia CNP  Discharging Service:  Orthopedics     Primary Provider: Cielo Mansfield          Admission Diagnoses:   Primary osteoarthritis of right hip [M16.11]  Status post total replacement of right hip [Z96.641]          Discharge Diagnosis:     Principal Problem:    Primary osteoarthritis of right hip  Active Problems:    Status post total replacement of right hip               Discharge Disposition:     Discharged to home           Condition on Discharge:     Discharge condition: Stable   Discharge vitals: Blood pressure 136/75, pulse 75, temperature 98.5  F (36.9  C), temperature source Oral, resp. rate 20, height 1.778 m (5' 10\"), SpO2 98 %.   Code status on discharge: Full Code           Procedures / Labs / Imaging:   No other significant procedures performed during this admission          Medications Prior to Admission:     Medications Prior to Admission   Medication Sig Dispense Refill Last Dose     lisinopril-hydrochlorothiazide (ZESTORETIC) 20-12.5 MG tablet Take 1 tablet by mouth daily   5/31/2021 at 0800     multivitamin w/minerals (MULTI-VITAMIN) tablet Take 1 tablet by mouth daily   6/1/2021 at 0515     omeprazole (PRILOSEC) 40 MG DR capsule Take 40 mg by mouth daily   6/1/2021 at 0515     simvastatin (ZOCOR) 40 MG tablet Take 40 mg by mouth At Bedtime   6/1/2021 at 0515     tamsulosin (FLOMAX) 0.4 MG capsule Take 0.4 mg by mouth daily   6/1/2021 at 0515     [DISCONTINUED] aspirin (ASA) 81 MG chewable tablet Take 81 mg by mouth daily   5/26/2021     [DISCONTINUED] diclofenac (VOLTAREN) 75 MG EC tablet Take 1 tablet (75 mg) by mouth 2 times daily as needed for moderate pain (Patient not taking: Reported on 5/26/2021) 40 tablet 1  "             Discharge Medications:     Current Discharge Medication List      START taking these medications    Details   acetaminophen (TYLENOL) 325 MG tablet Take 3 tablets (975 mg) by mouth every 8 hours as needed for other (mild pain)  Qty: 100 tablet, Refills: 1    Associated Diagnoses: S/P hip replacement, right      aspirin (ASA) 325 MG EC tablet Take 1 tablet (325 mg) by mouth 2 times daily  Qty: 84 tablet, Refills: 0    Associated Diagnoses: S/P hip replacement, right      hydrOXYzine (ATARAX) 25 MG tablet Take 1 tablet (25 mg) by mouth every 6 hours as needed for itching or anxiety (with pain, moderate pain)  Qty: 30 tablet, Refills: 0    Associated Diagnoses: S/P hip replacement, right      oxyCODONE (ROXICODONE) 5 MG tablet Take 1-2 tablets (5-10 mg) by mouth every 4 hours as needed for pain (Moderate to Severe)  Qty: 35 tablet, Refills: 0    Associated Diagnoses: S/P hip replacement, right      polyethylene glycol (MIRALAX) 17 g packet Take 17 g by mouth daily  Qty: 7 packet, Refills: 0    Associated Diagnoses: S/P hip replacement, right      senna-docusate (SENOKOT-S/PERICOLACE) 8.6-50 MG tablet Take 1-2 tablets by mouth 2 times daily Take while on oral narcotics to prevent or treat constipation.  Qty: 30 tablet, Refills: 0    Comments: While taking narcotics  Associated Diagnoses: S/P hip replacement, right      tiZANidine (ZANAFLEX) 2 MG tablet Take 1-2 tablets (2-4 mg) by mouth 3 times daily as needed for muscle spasms (pain)  Qty: 45 tablet, Refills: 1    Associated Diagnoses: S/P hip replacement, right         CONTINUE these medications which have CHANGED    Details   aspirin (ASA) 81 MG chewable tablet Take 1 tablet (81 mg) by mouth daily  Qty:           CONTINUE these medications which have NOT CHANGED    Details   lisinopril-hydrochlorothiazide (ZESTORETIC) 20-12.5 MG tablet Take 1 tablet by mouth daily      multivitamin w/minerals (MULTI-VITAMIN) tablet Take 1 tablet by mouth daily       omeprazole (PRILOSEC) 40 MG DR capsule Take 40 mg by mouth daily      simvastatin (ZOCOR) 40 MG tablet Take 40 mg by mouth At Bedtime      tamsulosin (FLOMAX) 0.4 MG capsule Take 0.4 mg by mouth daily         STOP taking these medications       diclofenac (VOLTAREN) 75 MG EC tablet Comments:   Reason for Stopping:                     Consultations:     No consultations were requested during this admission             Brief History of Illness:   See operative report          Hospital Course:     Patient admitted after routine elective surgery.  Patient discharge POD1 without incident.  By day of discharge discharge: Did well. Per patient felt good.    Continued to improve.  Pain was well-controlled with oral medications.  No fevers.   denied N/v. tolerated oral intake.  voiding adequate.  Per patient PT went well. Patient expressed desire to discharge this day.  Has family at home to help.  No concerns, questions, or new issues.  Therapy oked for discharge home with family support.               Significant Results:     None             Pending Results:     None           Discharge Instructions and Follow-Up:     Discharge diet: Regular   Discharge activity: Activity as tolerated   Discharge follow-up: 14 days with Dr. Fletcher, Hussain Recinos CNP or PRASHANTH Marti   Effected Extremity Right leg       Weight Bearing status Weight bearing as tolerated    6 weeks of posterior hip precautions   Lines and drains: None    Wound care: Keep incision covered with hospital dressing (Aquacel) for one week. It is okay to shower with this dressing on. However, do not submerge your dressing and incision in water for roughly 2 weeks. After one week remove this dressing and then keep it clean, dry, and covered. If this dressing become looses or falls off it is ok to cover with gauze and tape.  Wash the incision gently with warm soapy water after removing your hospital dressing and lightly pat dry.            TATUM Lobo,  CNP  Orthopedic Surgery

## 2021-06-04 ENCOUNTER — TELEPHONE (OUTPATIENT)
Dept: ORTHOPEDICS | Facility: OTHER | Age: 56
End: 2021-06-04

## 2021-06-04 NOTE — TELEPHONE ENCOUNTER
Reason for Call:  Form, our goal is to have forms completed with 72 hours, however, some forms may require a visit or additional information.    Type of letter, form or note:  disability    Who is the form from?: Insurance comp    Where did the form come from: form was faxed in    What clinic location was the form placed at?: [place]    Where the form was placed: Dr. Fletcher Box/Folder    What number is listed as a contact on the form?: josh fax [phone]       Additional comments: none    Call taken on 6/4/2021 at 2:57 PM by Nikki Patel

## 2021-06-07 NOTE — TELEPHONE ENCOUNTER
"Patient's form was filled out, reviewed, and signed by provider.      Form was faxed to the designated fax # [phone]    A copy was sent to scanning.     A copy was placed in 2nd floor RN \"faxed\" file/drawer.      Patient was informed.       Kecia RICO Lead RN, BSN. . .  6/7/2021, 3:41 PM    "

## 2021-06-07 NOTE — TELEPHONE ENCOUNTER
Forms were completed to the best of my ability and placed in the provider's basket to review and sign if appropriate.     Fax to [phone] when complete.     Kecia RICO, Lead RN, BSN. . .  6/7/2021, 3:10 PM

## 2021-06-14 ENCOUNTER — OFFICE VISIT (OUTPATIENT)
Dept: ORTHOPEDICS | Facility: CLINIC | Age: 56
End: 2021-06-14
Payer: COMMERCIAL

## 2021-06-14 ENCOUNTER — ANCILLARY PROCEDURE (OUTPATIENT)
Dept: GENERAL RADIOLOGY | Facility: CLINIC | Age: 56
End: 2021-06-14
Attending: PHYSICIAN ASSISTANT
Payer: COMMERCIAL

## 2021-06-14 VITALS
SYSTOLIC BLOOD PRESSURE: 129 MMHG | DIASTOLIC BLOOD PRESSURE: 77 MMHG | BODY MASS INDEX: 36.08 KG/M2 | HEIGHT: 70 IN | WEIGHT: 252 LBS

## 2021-06-14 DIAGNOSIS — Z48.89 AFTERCARE FOLLOWING SURGERY: ICD-10-CM

## 2021-06-14 DIAGNOSIS — Z96.641 STATUS POST TOTAL REPLACEMENT OF RIGHT HIP: ICD-10-CM

## 2021-06-14 DIAGNOSIS — Z96.641 STATUS POST TOTAL REPLACEMENT OF RIGHT HIP: Primary | ICD-10-CM

## 2021-06-14 PROBLEM — E66.01 MORBID OBESITY (H): Status: ACTIVE | Noted: 2021-06-14

## 2021-06-14 PROCEDURE — 99024 POSTOP FOLLOW-UP VISIT: CPT | Performed by: PHYSICIAN ASSISTANT

## 2021-06-14 PROCEDURE — 73502 X-RAY EXAM HIP UNI 2-3 VIEWS: CPT | Mod: TC | Performed by: RADIOLOGY

## 2021-06-14 ASSESSMENT — MIFFLIN-ST. JEOR: SCORE: 1984.31

## 2021-06-14 NOTE — PROGRESS NOTES
Orthopedic Clinic Post-Operative Note    CHIEF COMPLAINT:   Chief Complaint   Patient presents with     Surgical Followup     DOS: 6/1/2021~Right total hip arthroplasty~13 days postop       HISTORY OF PRESENT ILLNESS  Location: Right hip  Rating of Pain: Minimal pain  Pain is better with: Rest and Tylenol  Pain improving overall: Yes definitely  Associated Features: Patient denies any numbness or tingling.  Change patient denies any fevers or chills or drainage from the incision or any drainage at all.  Patient denies any incisional problems.  Patient denies any radicular symptoms.  Patient concerns: None  Additional History: Patient states the first few days were difficult but he only used oxycodone for the first day out of the hospital and he has not used any of the muscle relaxant medications.  Patient has been going to outpatient physical therapy [place] and this has been going well.  Patient took his last Tylenol yesterday and has been doing okay without the Tylenol.  Patient is here with his wife.    Patient's past medical, surgical, social and family histories reviewed.     No past medical history on file.    Past Surgical History:   Procedure Laterality Date     ARTHROPLASTY HIP Right 6/1/2021    Procedure: Right total hip replacement;  Surgeon: Usman Fletcher DO;  Location: [facility]       Medications:  acetaminophen (TYLENOL) 325 MG tablet, Take 3 tablets (975 mg) by mouth every 8 hours as needed for other (mild pain)  aspirin (ASA) 325 MG EC tablet, Take 1 tablet (325 mg) by mouth 2 times daily  lisinopril-hydrochlorothiazide (ZESTORETIC) 20-12.5 MG tablet, Take 1 tablet by mouth daily  multivitamin w/minerals (MULTI-VITAMIN) tablet, Take 1 tablet by mouth daily  omeprazole (PRILOSEC) 40 MG DR capsule, Take 40 mg by mouth daily  simvastatin (ZOCOR) 40 MG tablet, Take 40 mg by mouth At Bedtime  tamsulosin (FLOMAX) 0.4 MG capsule, Take 0.4 mg by mouth daily  [START ON 7/15/2021] aspirin (ASA) 81 MG  "chewable tablet, Take 1 tablet (81 mg) by mouth daily  hydrOXYzine (ATARAX) 25 MG tablet, Take 1 tablet (25 mg) by mouth every 6 hours as needed for itching or anxiety (with pain, moderate pain)  oxyCODONE (ROXICODONE) 5 MG tablet, Take 1-2 tablets (5-10 mg) by mouth every 4 hours as needed for pain (Moderate to Severe)  polyethylene glycol (MIRALAX) 17 g packet, Take 17 g by mouth daily  senna-docusate (SENOKOT-S/PERICOLACE) 8.6-50 MG tablet, Take 1-2 tablets by mouth 2 times daily Take while on oral narcotics to prevent or treat constipation.  tiZANidine (ZANAFLEX) 2 MG tablet, Take 1-2 tablets (2-4 mg) by mouth 3 times daily as needed for muscle spasms (pain)    No current facility-administered medications on file prior to visit.       No Known Allergies    Social History     Occupational History     Not on file   Tobacco Use     Smoking status: Never Smoker     Smokeless tobacco: Never Used   Substance and Sexual Activity     Alcohol use: Yes     Drug use: Never     Sexual activity: Not on file       No family history on file.    REVIEW OF SYSTEMS  General: negative for, night sweats, dizziness, fatigue  Resp: No shortness of breath and no cough  CV: negative for chest pain, syncope or near-syncope  GI: negative for nausea, vomiting and diarrhea  : negative for dysuria and hematuria  Musculoskeletal: as above  Neurologic: negative for syncope   Hematologic: negative for bleeding disorder    Physical Exam:  Vitals: /77   Ht 1.778 m (5' 10\")   Wt 114.3 kg (252 lb)   BMI 36.16 kg/m    BMI= Body mass index is 36.16 kg/m .  Constitutional: healthy, alert and no acute distress   Psychiatric: mentation appears normal and affect normal/bright  NEURO: no focal deficits, CMS intact right lower extremity   RESP: Normal with easy respirations and no use of accessory muscles to breathe, no audible wheezing or retractions  CV: Calf soft and nontender to palpation, leg warm   SKIN: The right hip incision has some " swelling but no erythema and no drainage.  There is some ecchymosis noted just distal to the incision and then down the posterior thigh and leg.  The rest of the leg with no erythema, rashes, excoriation, or breakdown. No evidence of infection.   MUSCULOSKELETAL:    INSPECTION of right hip: No gross deformities, erythema, edema, ecchymosis, atrophy or fasciculations.     PALPATION: no tenderness over the lateral hip and greater trochanteric region, thigh or lower leg.     ROM: Passive: Flexion to 125, internal rotation to 25, external rotation to 35 degrees.  All range of motion without any catching locking or pain.     STRENGTH: 5 out of 5 hip flexion, 4/5 quad and 5 out of 5 hamstring     SPECIAL TEST: none  GAIT: Slight right antalgic gait.  Lymph: no palpable lymph nodes    Diagnostic Modalities:  Recent Results (from the past 744 hour(s))   XR Pelvis w Hip Right 1 View    Narrative    PELVIS AND HIP RIGHT ONE VIEW   6/1/2021 10:22 AM     HISTORY: Status post total hip replacement, right.    COMPARISON: 3/31/2021 x-ray.      Impression    IMPRESSION: Right total hip arthroplasty in place. No evidence of  complication. Mild-to-moderate left hip degenerative changes.    KAMLA FELIZ MD     X-rays done today AP pelvis as well as AP and lateral of right hip showing good prosthesis placement no prosthesis failure no loosening of hardware or prosthesis.  These x-rays are compared to the x-rays done on 6/1/2021 and there is no changes.  No fracture no dislocation no tumor.    Independent visualization of the images was performed.      Impression: 1.  14 days status post right total hip arthroplasty by Dr. Fletcher    FOCUSED PLAN:   Patient doing very well today.  He is here with his wife.  Okay to start massaging incision in 1 week.  PT: Continue formal physical therapy as an outpatient in [place].  Anticoagulation: Continue enteric-coated aspirin 325 twice a day times full 6 weeks status post surgery.  Continue  posterior hip precautions for a total of 6 weeks status post surgery.  Discussed x-ray with patient and his wife.  They are very happy everything looks good.  Patient did go fishing the other day and he is warned not to overdo it, he understands.  Follow-up with Dr. Fletcher in 4 weeks which would be 6 weeks status post surgery.     Re-x-ray on return: No    BP Readings from Last 1 Encounters:   06/14/21 129/77       BP noted to be well controlled today in office.      Patient does not use Tobacco products.    This note was dictated with [employer].    Brady Colon PA-C

## 2021-06-14 NOTE — LETTER
6/14/2021         RE: Marito Hernandes  [address]  [facility] [address]        Dear Colleague,    Thank you for referring your patient, Marito Hernandes, to the [facility]. Please see a copy of my visit note below.    Orthopedic Clinic Post-Operative Note    CHIEF COMPLAINT:   Chief Complaint   Patient presents with     Surgical Followup     DOS: 6/1/2021~Right total hip arthroplasty~13 days postop       HISTORY OF PRESENT ILLNESS  Location: Right hip  Rating of Pain: Minimal pain  Pain is better with: Rest and Tylenol  Pain improving overall: Yes definitely  Associated Features: Patient denies any numbness or tingling.  Change patient denies any fevers or chills or drainage from the incision or any drainage at all.  Patient denies any incisional problems.  Patient denies any radicular symptoms.  Patient concerns: None  Additional History: Patient states the first few days were difficult but he only used oxycodone for the first day out of the hospital and he has not used any of the muscle relaxant medications.  Patient has been going to outpatient physical therapy [place] and this has been going well.  Patient took his last Tylenol yesterday and has been doing okay without the Tylenol.  Patient is here with his wife.    Patient's past medical, surgical, social and family histories reviewed.     No past medical history on file.    Past Surgical History:   Procedure Laterality Date     ARTHROPLASTY HIP Right 6/1/2021    Procedure: Right total hip replacement;  Surgeon: Usman Fletcher DO;  Location:  OR       Medications:  acetaminophen (TYLENOL) 325 MG tablet, Take 3 tablets (975 mg) by mouth every 8 hours as needed for other (mild pain)  aspirin (ASA) 325 MG EC tablet, Take 1 tablet (325 mg) by mouth 2 times daily  lisinopril-hydrochlorothiazide (ZESTORETIC) 20-12.5 MG tablet, Take 1 tablet by mouth daily  multivitamin w/minerals (MULTI-VITAMIN) tablet, Take 1 tablet by  "mouth daily  omeprazole (PRILOSEC) 40 MG DR capsule, Take 40 mg by mouth daily  simvastatin (ZOCOR) 40 MG tablet, Take 40 mg by mouth At Bedtime  tamsulosin (FLOMAX) 0.4 MG capsule, Take 0.4 mg by mouth daily  [START ON 7/15/2021] aspirin (ASA) 81 MG chewable tablet, Take 1 tablet (81 mg) by mouth daily  hydrOXYzine (ATARAX) 25 MG tablet, Take 1 tablet (25 mg) by mouth every 6 hours as needed for itching or anxiety (with pain, moderate pain)  oxyCODONE (ROXICODONE) 5 MG tablet, Take 1-2 tablets (5-10 mg) by mouth every 4 hours as needed for pain (Moderate to Severe)  polyethylene glycol (MIRALAX) 17 g packet, Take 17 g by mouth daily  senna-docusate (SENOKOT-S/PERICOLACE) 8.6-50 MG tablet, Take 1-2 tablets by mouth 2 times daily Take while on oral narcotics to prevent or treat constipation.  tiZANidine (ZANAFLEX) 2 MG tablet, Take 1-2 tablets (2-4 mg) by mouth 3 times daily as needed for muscle spasms (pain)    No current facility-administered medications on file prior to visit.       No Known Allergies    Social History     Occupational History     Not on file   Tobacco Use     Smoking status: Never Smoker     Smokeless tobacco: Never Used   Substance and Sexual Activity     Alcohol use: Yes     Drug use: Never     Sexual activity: Not on file       No family history on file.    REVIEW OF SYSTEMS  General: negative for, night sweats, dizziness, fatigue  Resp: No shortness of breath and no cough  CV: negative for chest pain, syncope or near-syncope  GI: negative for nausea, vomiting and diarrhea  : negative for dysuria and hematuria  Musculoskeletal: as above  Neurologic: negative for syncope   Hematologic: negative for bleeding disorder    Physical Exam:  Vitals: /77   Ht 1.778 m (5' 10\")   Wt 114.3 kg (252 lb)   BMI 36.16 kg/m    BMI= Body mass index is 36.16 kg/m .  Constitutional: healthy, alert and no acute distress   Psychiatric: mentation appears normal and affect normal/bright  NEURO: no focal " deficits, CMS intact right lower extremity   RESP: Normal with easy respirations and no use of accessory muscles to breathe, no audible wheezing or retractions  CV: Calf soft and nontender to palpation, leg warm   SKIN: The right hip incision has some swelling but no erythema and no drainage.  There is some ecchymosis noted just distal to the incision and then down the posterior thigh and leg.  The rest of the leg with no erythema, rashes, excoriation, or breakdown. No evidence of infection.   MUSCULOSKELETAL:    INSPECTION of right hip: No gross deformities, erythema, edema, ecchymosis, atrophy or fasciculations.     PALPATION: no tenderness over the lateral hip and greater trochanteric region, thigh or lower leg.     ROM: Passive: Flexion to 125, internal rotation to 25, external rotation to 35 degrees.  All range of motion without any catching locking or pain.     STRENGTH: 5 out of 5 hip flexion, 4/5 quad and 5 out of 5 hamstring     SPECIAL TEST: none  GAIT: Slight right antalgic gait.  Lymph: no palpable lymph nodes    Diagnostic Modalities:  Recent Results (from the past 744 hour(s))   XR Pelvis w Hip Right 1 View    Narrative    PELVIS AND HIP RIGHT ONE VIEW   6/1/2021 10:22 AM     HISTORY: Status post total hip replacement, right.    COMPARISON: 3/31/2021 x-ray.      Impression    IMPRESSION: Right total hip arthroplasty in place. No evidence of  complication. Mild-to-moderate left hip degenerative changes.    KAMLA FELIZ MD     X-rays done today AP pelvis as well as AP and lateral of right hip showing good prosthesis placement no prosthesis failure no loosening of hardware or prosthesis.  These x-rays are compared to the x-rays done on 6/1/2021 and there is no changes.  No fracture no dislocation no tumor.    Independent visualization of the images was performed.      Impression: 1.  14 days status post right total hip arthroplasty by Dr. Fletcher    FOCUSED PLAN:   Patient doing very well today.  He is  here with his wife.  Okay to start massaging incision in 1 week.  PT: Continue formal physical therapy as an outpatient in [place].  Anticoagulation: Continue enteric-coated aspirin 325 twice a day times full 6 weeks status post surgery.  Continue posterior hip precautions for a total of 6 weeks status post surgery.  Discussed x-ray with patient and his wife.  They are very happy everything looks good.  Patient did go fishing the other day and he is warned not to overdo it, he understands.  Follow-up with Dr. Fletcher in 4 weeks which would be 6 weeks status post surgery.     Re-x-ray on return: No    BP Readings from Last 1 Encounters:   06/14/21 129/77       BP noted to be well controlled today in office.      Patient does not use Tobacco products.    This note was dictated with [employer].    Brady Colon PA-C              Again, thank you for allowing me to participate in the care of your patient.        Sincerely,        Brady Colon PA-C

## 2021-07-12 ENCOUNTER — OFFICE VISIT (OUTPATIENT)
Dept: ORTHOPEDICS | Facility: CLINIC | Age: 56
End: 2021-07-12
Payer: COMMERCIAL

## 2021-07-12 VITALS
WEIGHT: 253.5 LBS | BODY MASS INDEX: 36.29 KG/M2 | DIASTOLIC BLOOD PRESSURE: 72 MMHG | SYSTOLIC BLOOD PRESSURE: 120 MMHG | HEIGHT: 70 IN

## 2021-07-12 DIAGNOSIS — Z96.641 STATUS POST TOTAL REPLACEMENT OF RIGHT HIP: Primary | ICD-10-CM

## 2021-07-12 PROCEDURE — 99024 POSTOP FOLLOW-UP VISIT: CPT | Performed by: ORTHOPAEDIC SURGERY

## 2021-07-12 ASSESSMENT — MIFFLIN-ST. JEOR: SCORE: 1991.12

## 2021-07-12 ASSESSMENT — PAIN SCALES - GENERAL: PAINLEVEL: MILD PAIN (3)

## 2021-07-12 NOTE — LETTER
7/12/2021         RE: Marito Hernandes  [address]  [facility] [address]        Dear Colleague,    Thank you for referring your patient, Marito Hernandes, to the [facility]. Please see a copy of my visit note below.    Orthopedic Clinic Post-Operative Note    CHIEF COMPLAINT:   Chief Complaint   Patient presents with     Musculoskeletal Problem     right hip follow up     Surgical Followup     DOS: 6/1/2021~Right total hip arthroplasty~6 weeks postop       HISTORY OF PRESENT ILLNESS  Very happy.  Making progress.  Continuing with exercises.    Patient's past medical, surgical, social and family histories reviewed.     No past medical history on file.    Past Surgical History:   Procedure Laterality Date     ARTHROPLASTY HIP Right 6/1/2021    Procedure: Right total hip replacement;  Surgeon: Usman Fletcher DO;  Location:  OR       Medications:  acetaminophen (TYLENOL) 325 MG tablet, Take 3 tablets (975 mg) by mouth every 8 hours as needed for other (mild pain)  aspirin (ASA) 325 MG EC tablet, Take 1 tablet (325 mg) by mouth 2 times daily  lisinopril-hydrochlorothiazide (ZESTORETIC) 20-12.5 MG tablet, Take 1 tablet by mouth daily  multivitamin w/minerals (MULTI-VITAMIN) tablet, Take 1 tablet by mouth daily  omeprazole (PRILOSEC) 40 MG DR capsule, Take 40 mg by mouth daily  simvastatin (ZOCOR) 40 MG tablet, Take 40 mg by mouth At Bedtime  tamsulosin (FLOMAX) 0.4 MG capsule, Take 0.4 mg by mouth daily    No current facility-administered medications on file prior to visit.      No Known Allergies    Social History     Occupational History     Not on file   Tobacco Use     Smoking status: Never Smoker     Smokeless tobacco: Never Used   Substance and Sexual Activity     Alcohol use: Yes     Drug use: Never     Sexual activity: Not on file       No family history on file.    REVIEW OF SYSTEMS  General: negative for, night sweats, dizziness, fatigue  Resp: No shortness of breath  "and no cough  CV: negative for chest pain, syncope or near-syncope  GI: negative for nausea, vomiting and diarrhea  : negative for dysuria and hematuria  Musculoskeletal: as above  Neurologic: negative for syncope   Hematologic: negative for bleeding disorder    Physical Exam:  Vitals: /72   Ht 1.778 m (5' 10\")   Wt 115 kg (253 lb 8 oz)   BMI 36.37 kg/m    BMI= Body mass index is 36.37 kg/m .  Constitutional: healthy, alert and no acute distress   Psychiatric: mentation appears normal and affect normal/bright  NEURO: no focal deficits  SKIN: .well healed, no erythema, no incision breakdown and no drainage.  JOINT/EXTREMITIES: Able to go from a seated to stand position with no difficulty.  Some stiffness to internal rotation.  No peripheral edema.  GAIT: non-antalgic    Diagnostic Modalities:  Previous imaging reviewed.  Independent visualization of the images was performed.      Impression:   Chief Complaint   Patient presents with     Musculoskeletal Problem     right hip follow up     Surgical Followup     DOS: 6/1/2021~Right total hip arthroplasty~6 weeks postop   Doing well.    Plan:   Continue to advance activities as he tolerates.  Plan to return to work August 22 which is reasonable.  Plan to see him a week or-2 beforehand.  All questions answered.  Is content    Return to clinic 4-6 , week(s), PRN, or sooner as needed for changes.    Re-x-ray on return: Yes.    Clinton Fletcher D.O.      Again, thank you for allowing me to participate in the care of your patient.        Sincerely,        Usman Fletcher, DO    "

## 2021-07-12 NOTE — PROGRESS NOTES
Orthopedic Clinic Post-Operative Note    CHIEF COMPLAINT:   Chief Complaint   Patient presents with     Musculoskeletal Problem     right hip follow up     Surgical Followup     DOS: 6/1/2021~Right total hip arthroplasty~6 weeks postop       HISTORY OF PRESENT ILLNESS  Very happy.  Making progress.  Continuing with exercises.    Patient's past medical, surgical, social and family histories reviewed.     No past medical history on file.    Past Surgical History:   Procedure Laterality Date     ARTHROPLASTY HIP Right 6/1/2021    Procedure: Right total hip replacement;  Surgeon: Usman Fletcher DO;  Location:  OR       Medications:  acetaminophen (TYLENOL) 325 MG tablet, Take 3 tablets (975 mg) by mouth every 8 hours as needed for other (mild pain)  aspirin (ASA) 325 MG EC tablet, Take 1 tablet (325 mg) by mouth 2 times daily  lisinopril-hydrochlorothiazide (ZESTORETIC) 20-12.5 MG tablet, Take 1 tablet by mouth daily  multivitamin w/minerals (MULTI-VITAMIN) tablet, Take 1 tablet by mouth daily  omeprazole (PRILOSEC) 40 MG DR capsule, Take 40 mg by mouth daily  simvastatin (ZOCOR) 40 MG tablet, Take 40 mg by mouth At Bedtime  tamsulosin (FLOMAX) 0.4 MG capsule, Take 0.4 mg by mouth daily    No current facility-administered medications on file prior to visit.      No Known Allergies    Social History     Occupational History     Not on file   Tobacco Use     Smoking status: Never Smoker     Smokeless tobacco: Never Used   Substance and Sexual Activity     Alcohol use: Yes     Drug use: Never     Sexual activity: Not on file       No family history on file.    REVIEW OF SYSTEMS  General: negative for, night sweats, dizziness, fatigue  Resp: No shortness of breath and no cough  CV: negative for chest pain, syncope or near-syncope  GI: negative for nausea, vomiting and diarrhea  : negative for dysuria and hematuria  Musculoskeletal: as above  Neurologic: negative for syncope   Hematologic: negative for  "bleeding disorder    Physical Exam:  Vitals: /72   Ht 1.778 m (5' 10\")   Wt 115 kg (253 lb 8 oz)   BMI 36.37 kg/m    BMI= Body mass index is 36.37 kg/m .  Constitutional: healthy, alert and no acute distress   Psychiatric: mentation appears normal and affect normal/bright  NEURO: no focal deficits  SKIN: .well healed, no erythema, no incision breakdown and no drainage.  JOINT/EXTREMITIES: Able to go from a seated to stand position with no difficulty.  Some stiffness to internal rotation.  No peripheral edema.  GAIT: non-antalgic    Diagnostic Modalities:  Previous imaging reviewed.  Independent visualization of the images was performed.      Impression:   Chief Complaint   Patient presents with     Musculoskeletal Problem     right hip follow up     Surgical Followup     DOS: 6/1/2021~Right total hip arthroplasty~6 weeks postop   Doing well.    Plan:   Continue to advance activities as he tolerates.  Plan to return to work August 22 which is reasonable.  Plan to see him a week or-2 beforehand.  All questions answered.  Is content    Return to clinic 4-6 , week(s), PRN, or sooner as needed for changes.    Re-x-ray on return: Yes.    Clinton Fletcher D.O.  "

## 2021-08-04 ENCOUNTER — OFFICE VISIT (OUTPATIENT)
Dept: ORTHOPEDICS | Facility: CLINIC | Age: 56
End: 2021-08-04
Payer: COMMERCIAL

## 2021-08-04 ENCOUNTER — ANCILLARY PROCEDURE (OUTPATIENT)
Dept: GENERAL RADIOLOGY | Facility: CLINIC | Age: 56
End: 2021-08-04
Attending: ORTHOPAEDIC SURGERY
Payer: COMMERCIAL

## 2021-08-04 VITALS
WEIGHT: 252.2 LBS | SYSTOLIC BLOOD PRESSURE: 120 MMHG | DIASTOLIC BLOOD PRESSURE: 66 MMHG | BODY MASS INDEX: 36.11 KG/M2 | HEIGHT: 70 IN

## 2021-08-04 DIAGNOSIS — Z96.641 STATUS POST TOTAL REPLACEMENT OF RIGHT HIP: Primary | ICD-10-CM

## 2021-08-04 DIAGNOSIS — Z96.641 STATUS POST TOTAL REPLACEMENT OF RIGHT HIP: ICD-10-CM

## 2021-08-04 PROCEDURE — 73502 X-RAY EXAM HIP UNI 2-3 VIEWS: CPT | Mod: TC | Performed by: RADIOLOGY

## 2021-08-04 PROCEDURE — 99024 POSTOP FOLLOW-UP VISIT: CPT | Performed by: ORTHOPAEDIC SURGERY

## 2021-08-04 ASSESSMENT — MIFFLIN-ST. JEOR: SCORE: 1985.22

## 2021-08-04 ASSESSMENT — PAIN SCALES - GENERAL: PAINLEVEL: NO PAIN (0)

## 2021-08-04 NOTE — LETTER
8/4/2021         RE: Marito Hernandes  [address]  [facility] [address]        Dear Colleague,    Thank you for referring your patient, Marito Hernandes, to the [facility]. Please see a copy of my visit note below.    Orthopedic Clinic Post-Operative Note    CHIEF COMPLAINT:   Chief Complaint   Patient presents with     RECHECK     right hip follow up     Surgical Followup     DOS: 6/1/2021~Right total hip arthroplasty~9 weeks postop       HISTORY OF PRESENT ILLNESS  Extremely happy.  Here with his wife.  No pain.    Patient's past medical, surgical, social and family histories reviewed.     No past medical history on file.    Past Surgical History:   Procedure Laterality Date     ARTHROPLASTY HIP Right 6/1/2021    Procedure: Right total hip replacement;  Surgeon: Usman Fletcher DO;  Location:  OR       Medications:  acetaminophen (TYLENOL) 325 MG tablet, Take 3 tablets (975 mg) by mouth every 8 hours as needed for other (mild pain)  lisinopril-hydrochlorothiazide (ZESTORETIC) 20-12.5 MG tablet, Take 1 tablet by mouth daily  multivitamin w/minerals (MULTI-VITAMIN) tablet, Take 1 tablet by mouth daily  omeprazole (PRILOSEC) 40 MG DR capsule, Take 40 mg by mouth daily  simvastatin (ZOCOR) 40 MG tablet, Take 40 mg by mouth At Bedtime  tamsulosin (FLOMAX) 0.4 MG capsule, Take 0.4 mg by mouth daily    No current facility-administered medications on file prior to visit.      No Known Allergies    Social History     Occupational History     Not on file   Tobacco Use     Smoking status: Never Smoker     Smokeless tobacco: Never Used   Substance and Sexual Activity     Alcohol use: Yes     Drug use: Never     Sexual activity: Not on file       No family history on file.    REVIEW OF SYSTEMS  General: negative for, night sweats, dizziness, fatigue  Resp: No shortness of breath and no cough  CV: negative for chest pain, syncope or near-syncope  GI: negative for nausea, vomiting and  "diarrhea  : negative for dysuria and hematuria  Musculoskeletal: as above  Neurologic: negative for syncope   Hematologic: negative for bleeding disorder    Physical Exam:  Vitals: /66   Ht 1.778 m (5' 10\")   Wt 114.4 kg (252 lb 3.2 oz)   BMI 36.19 kg/m    BMI= Body mass index is 36.19 kg/m .  Constitutional: healthy, alert and no acute distress   Psychiatric: mentation appears normal and affect normal/bright  NEURO: no focal deficits  SKIN: .well healed, no erythema, no incision breakdown and no drainage.  JOINT/EXTREMITIES: Full motion with no evidence of instability.  Able to go from a seated to stand position with no pain or difficulties.  GAIT: not tested     Diagnostic Modalities:  right hip X-ray: The prosthesis has acceptable alignment. No fractures or dislocations. Prosthesis is well seated with no evidence of loosening  Independent visualization of the images was performed.      Impression:   Chief Complaint   Patient presents with     RECHECK     right hip follow up     Surgical Followup     DOS: 6/1/2021~Right total hip arthroplasty~9 weeks postop   Doing as expected.    Plan:   Doing well.  Continue to focus on strengthening through the pelvis.    Overall he is extremely happy.  He wants to go back to work which is mainly seated.  Provided him a work note to return August 13 unrestricted.    Plan to see him at the year anniversary, sooner if need be.  We also discussed dental prophylaxis.  I typically like to prophylax before cleanings for 1 year after surgery.    Return to clinic PRN, or sooner as needed for changes.    Re-x-ray on return: Yes.    Clinton Fletcher D.O.      Again, thank you for allowing me to participate in the care of your patient.        Sincerely,        Usmna Fletcher, DO    "

## 2021-08-04 NOTE — LETTER
August 4, 2021        Marito Hernandes  [address]  [facility] [address]          To whom it may concern:    RE: Marito Hernandes    Patient was seen and treated today at our clinic.  Patient may return to work Aug.. 13TH 2021 with the following:  No restrictions    Please contact me for questions or concerns.      Sincerely,        Usman Fletcher, DO

## 2021-08-04 NOTE — PROGRESS NOTES
"Orthopedic Clinic Post-Operative Note    CHIEF COMPLAINT:   Chief Complaint   Patient presents with     RECHECK     right hip follow up     Surgical Followup     DOS: 6/1/2021~Right total hip arthroplasty~9 weeks postop       HISTORY OF PRESENT ILLNESS  Extremely happy.  Here with his wife.  No pain.    Patient's past medical, surgical, social and family histories reviewed.     No past medical history on file.    Past Surgical History:   Procedure Laterality Date     ARTHROPLASTY HIP Right 6/1/2021    Procedure: Right total hip replacement;  Surgeon: Usman Fletcher DO;  Location:  OR       Medications:  acetaminophen (TYLENOL) 325 MG tablet, Take 3 tablets (975 mg) by mouth every 8 hours as needed for other (mild pain)  lisinopril-hydrochlorothiazide (ZESTORETIC) 20-12.5 MG tablet, Take 1 tablet by mouth daily  multivitamin w/minerals (MULTI-VITAMIN) tablet, Take 1 tablet by mouth daily  omeprazole (PRILOSEC) 40 MG DR capsule, Take 40 mg by mouth daily  simvastatin (ZOCOR) 40 MG tablet, Take 40 mg by mouth At Bedtime  tamsulosin (FLOMAX) 0.4 MG capsule, Take 0.4 mg by mouth daily    No current facility-administered medications on file prior to visit.      No Known Allergies    Social History     Occupational History     Not on file   Tobacco Use     Smoking status: Never Smoker     Smokeless tobacco: Never Used   Substance and Sexual Activity     Alcohol use: Yes     Drug use: Never     Sexual activity: Not on file       No family history on file.    REVIEW OF SYSTEMS  General: negative for, night sweats, dizziness, fatigue  Resp: No shortness of breath and no cough  CV: negative for chest pain, syncope or near-syncope  GI: negative for nausea, vomiting and diarrhea  : negative for dysuria and hematuria  Musculoskeletal: as above  Neurologic: negative for syncope   Hematologic: negative for bleeding disorder    Physical Exam:  Vitals: /66   Ht 1.778 m (5' 10\")   Wt 114.4 kg (252 lb 3.2 oz)   " BMI 36.19 kg/m    BMI= Body mass index is 36.19 kg/m .  Constitutional: healthy, alert and no acute distress   Psychiatric: mentation appears normal and affect normal/bright  NEURO: no focal deficits  SKIN: .well healed, no erythema, no incision breakdown and no drainage.  JOINT/EXTREMITIES: Full motion with no evidence of instability.  Able to go from a seated to stand position with no pain or difficulties.  GAIT: not tested     Diagnostic Modalities:  right hip X-ray: The prosthesis has acceptable alignment. No fractures or dislocations. Prosthesis is well seated with no evidence of loosening  Independent visualization of the images was performed.      Impression:   Chief Complaint   Patient presents with     RECHECK     right hip follow up     Surgical Followup     DOS: 6/1/2021~Right total hip arthroplasty~9 weeks postop   Doing as expected.    Plan:   Doing well.  Continue to focus on strengthening through the pelvis.    Overall he is extremely happy.  He wants to go back to work which is mainly seated.  Provided him a work note to return August 13 unrestricted.    Plan to see him at the year anniversary, sooner if need be.  We also discussed dental prophylaxis.  I typically like to prophylax before cleanings for 1 year after surgery.    Return to clinic PRN, or sooner as needed for changes.    Re-x-ray on return: Yes.    Clinton Fletcher D.O.

## 2021-10-11 ENCOUNTER — HEALTH MAINTENANCE LETTER (OUTPATIENT)
Age: 56
End: 2021-10-11

## 2021-12-06 ENCOUNTER — TELEPHONE (OUTPATIENT)
Dept: ORTHOPEDICS | Facility: OTHER | Age: 56
End: 2021-12-06
Payer: COMMERCIAL

## 2021-12-06 NOTE — TELEPHONE ENCOUNTER
Reason for Call:  Form, our goal is to have forms completed with 72 hours, however, some forms may require a visit or additional information.    Type of letter, form or note:  handicap    Who is the form from?: Patient    Where did the form come from: Patient or family brought in       What clinic location was the form placed at?: [facility]    Where the form was placed: Dr. Fletcher  Box/Folder    What number is listed as a contact on the form?: none        Additional comments: any call patient when done     Call taken on 12/6/2021 at 2:45 PM by Nikki Patel

## 2021-12-07 NOTE — TELEPHONE ENCOUNTER
Forms were filled out. Placed on Hussain Recinos's desk awaiting signature.    Patient will need to re-sign forms. Patient will pick-up once completed.    Dianne Lutz RN on 12/7/2021 at 10:38 AM

## 2021-12-08 NOTE — TELEPHONE ENCOUNTER
Will have nursing reaching out to patient.   Patient requesting a handicap form. Had the right hip replaced in June.  Should not need a handicap form for that hip anymore.  If it is for the left hip will complete it for the left hip.     TATUM Lobo, CNP  Orthopedic Surgery

## 2021-12-08 NOTE — TELEPHONE ENCOUNTER
Writer spoke with patient and this is for his left hip. Patient plans in having surgery on this hip as well but needs to wait until he has more time saved up at work.    Jennifer Perez RN on 12/8/2021 at 10:02 AM

## 2021-12-08 NOTE — TELEPHONE ENCOUNTER
Form completed. Given to Josefina RICO Completed for left hip advanced osteoarthritis.    TATUM Lobo, CNP  Orthopedic Surgery

## 2022-05-22 ENCOUNTER — HEALTH MAINTENANCE LETTER (OUTPATIENT)
Age: 57
End: 2022-05-22

## 2022-09-25 ENCOUNTER — HEALTH MAINTENANCE LETTER (OUTPATIENT)
Age: 57
End: 2022-09-25

## 2023-04-18 ENCOUNTER — TRANSCRIBE ORDERS (OUTPATIENT)
Dept: RESPIRATORY THERAPY | Facility: CLINIC | Age: 58
End: 2023-04-18
Payer: COMMERCIAL

## 2023-04-18 DIAGNOSIS — R06.02 SHORTNESS OF BREATH: Primary | ICD-10-CM

## 2023-05-22 RX ORDER — ALBUTEROL SULFATE 0.83 MG/ML
2.5 SOLUTION RESPIRATORY (INHALATION) ONCE
Status: COMPLETED | OUTPATIENT
Start: 2023-05-23 | End: 2023-05-23

## 2023-05-23 ENCOUNTER — HOSPITAL ENCOUNTER (OUTPATIENT)
Dept: RADIOLOGY | Facility: CLINIC | Age: 58
Discharge: HOME | End: 2023-05-23
Attending: PHYSICAL MEDICINE & REHABILITATION
Payer: OTHER GOVERNMENT

## 2023-05-23 ENCOUNTER — HOSPITAL ENCOUNTER (OUTPATIENT)
Dept: RESPIRATORY THERAPY | Facility: CLINIC | Age: 58
Discharge: HOME | End: 2023-05-23
Attending: PHYSICAL MEDICINE & REHABILITATION
Payer: OTHER GOVERNMENT

## 2023-05-23 DIAGNOSIS — R06.02 SOB (SHORTNESS OF BREATH): ICD-10-CM

## 2023-05-23 DIAGNOSIS — R06.02 SHORTNESS OF BREATH: ICD-10-CM

## 2023-05-23 LAB
EXPTIME-PRE: 7.75 SEC
FEF2575-%PRED-POST: 83 %
FEF2575-%PRED-PRE: 60 %
FEF2575-POST: 2.52 L/SEC
FEF2575-PRE: 1.83 L/SEC
FEF2575-PRED: 3 L/SEC
FEFMAX-%PRED-PRE: 88 %
FEFMAX-PRE: 8.31 L/SEC
FEFMAX-PRED: 9.43 L/SEC
FEV1-%PRED-PRE: 76 %
FEV1-PRE: 2.65 L
FEV1FEV6-PRE: 74 %
FEV1FEV6-PRED: 79 %
FEV1FVC-PRE: 73 %
FEV1FVC-PRED: 79 %
FIFMAX-PRE: 6.71 L/SEC
FVC-%PRED-PRE: 83 %
FVC-PRE: 3.64 L
FVC-PRED: 4.38 L

## 2023-05-23 PROCEDURE — 71046 X-RAY EXAM CHEST 2 VIEWS: CPT

## 2023-05-23 PROCEDURE — 94060 EVALUATION OF WHEEZING: CPT

## 2023-05-23 PROCEDURE — 250N000009 HC RX 250

## 2023-05-23 PROCEDURE — 999N000157 HC STATISTIC RCP TIME EA 10 MIN

## 2023-05-23 RX ADMIN — ALBUTEROL SULFATE 2.5 MG: 2.5 SOLUTION RESPIRATORY (INHALATION) at 09:58

## 2023-05-23 NOTE — PROGRESS NOTES
RESPIRATORY CARE NOTE    Pre and Post Spirometry with BD Pulmonary Function Test completed by patient.  Good patient effort and cooperation. Albuterol 2.5 mg neb given for bronchodilation.  The results of this test meet the ATS standards for acceptability and repeatability. PT returned to baseline and left in no distress.    Mercy Tam, RT  5/23/2023

## 2023-08-02 NOTE — PROGRESS NOTES
"S-(situation): Patient discharged to home via w/c with wife.    B-(background): Observation goals met     A-(assessment):right hip dressing replaced with aquacelle. Pt up with SBA/walker. Pain management with Roxicodone. Teds on.  /75   Pulse 75   Temp 98.5  F (36.9  C) (Oral)   Resp 20   Ht 1.778 m (5' 10\")   SpO2 98%   BMI 37.31 kg/m       R-(recommendations): Discharge instructions reviewed with pt and wife. Listed belongings gathered and returned to patient.  Patient Education resolved: Yes  New medications-Pt. Has been educated about reason of use and side effects Yes  Home medications returned to patient NA  Medication Bin checked and emptied on discharge Yes      " None

## 2024-03-02 ENCOUNTER — HEALTH MAINTENANCE LETTER (OUTPATIENT)
Age: 59
End: 2024-03-02

## 2025-03-15 ENCOUNTER — HEALTH MAINTENANCE LETTER (OUTPATIENT)
Age: 60
End: 2025-03-15

## (undated) DEVICE — GLOVE PROTEXIS W/NEU-THERA 7.5  2D73TE75

## (undated) DEVICE — TAPE MEDIPORE 4"X10YD 2964

## (undated) DEVICE — ADH SKIN CLOSURE PREMIERPRO EXOFIN 1.0ML 3470

## (undated) DEVICE — BNDG COBAN 6"X5YDS STERILE

## (undated) DEVICE — BONE CLEANING TIP INTERPULSE  0210-010-000

## (undated) DEVICE — DRAPE U SPLIT 74X120" 29440

## (undated) DEVICE — DRAPE SHEET REV FOLD 3/4 9349

## (undated) DEVICE — GLOVE ESTEEM BLUE W/NEU-THERA 8.0  2D73PB80

## (undated) DEVICE — PACK TOTAL JOINT STD LATEX

## (undated) DEVICE — GLOVE ESTEEM BLUE W/NEU-THERA 7.5  2D73PB75

## (undated) DEVICE — SUCTION IRR SYSTEM W/O TIP INTERPULSE HANDPIECE 0210-100-000

## (undated) DEVICE — SU VICRYL 0 OS-6 18" UND J754T

## (undated) DEVICE — DRAPE CONVERTORS U-DRAPE 60X72" 8476

## (undated) DEVICE — ESU ELEC BLADE 6" COATED E1450-6

## (undated) DEVICE — DRAPE STERI TOWEL SM 1000

## (undated) DEVICE — GLOVE PROTEXIS W/NEU-THERA 7.0  2D73TE70

## (undated) DEVICE — SOL NACL 0.9% IRRIG 3000ML BAG 07972-08

## (undated) DEVICE — CAST PADDING 6" COTTON WEBRIL UNSTERILE 9086

## (undated) DEVICE — NDL COUNTER 40CT

## (undated) DEVICE — SU VICRYL 2-0 CP-2 18" UND J762D

## (undated) DEVICE — BLADE SAW SAGITTAL STRK 25X90X1.19MM HD SYS 6 6125-119-090

## (undated) DEVICE — DRSG GAUZE 4X4" TRAY

## (undated) DEVICE — DRAPE POUCH INSTRUMENT 1018

## (undated) DEVICE — SU ETHIBOND 2 V-37 4X30" MX69G

## (undated) DEVICE — PREP CHLORAPREP 26ML TINTED ORANGE  260815

## (undated) DEVICE — SUCTION TIP YANKAUER ORTHO SUPER SUCKER EFS-111

## (undated) DEVICE — ESU GROUND PAD UNIVERSAL W/O CORD

## (undated) DEVICE — ESU PENCIL W/SMOKE EVAC

## (undated) DEVICE — HOOD FLYTE 0408-800-000

## (undated) RX ORDER — ONDANSETRON 2 MG/ML
INJECTION INTRAMUSCULAR; INTRAVENOUS
Status: DISPENSED
Start: 2021-06-01

## (undated) RX ORDER — LIDOCAINE HYDROCHLORIDE 20 MG/ML
INJECTION, SOLUTION EPIDURAL; INFILTRATION; INTRACAUDAL; PERINEURAL
Status: DISPENSED
Start: 2021-06-01

## (undated) RX ORDER — PROPOFOL 10 MG/ML
INJECTION, EMULSION INTRAVENOUS
Status: DISPENSED
Start: 2021-06-01

## (undated) RX ORDER — DEXAMETHASONE SODIUM PHOSPHATE 10 MG/ML
INJECTION, SOLUTION INTRAMUSCULAR; INTRAVENOUS
Status: DISPENSED
Start: 2021-06-01

## (undated) RX ORDER — FENTANYL CITRATE-0.9 % NACL/PF 10 MCG/ML
PLASTIC BAG, INJECTION (ML) INTRAVENOUS
Status: DISPENSED
Start: 2021-06-01

## (undated) RX ORDER — KETOROLAC TROMETHAMINE 30 MG/ML
INJECTION, SOLUTION INTRAMUSCULAR; INTRAVENOUS
Status: DISPENSED
Start: 2021-06-01

## (undated) RX ORDER — FENTANYL CITRATE 50 UG/ML
INJECTION, SOLUTION INTRAMUSCULAR; INTRAVENOUS
Status: DISPENSED
Start: 2021-06-01